# Patient Record
Sex: FEMALE | Race: WHITE | NOT HISPANIC OR LATINO | Employment: UNEMPLOYED | URBAN - METROPOLITAN AREA
[De-identification: names, ages, dates, MRNs, and addresses within clinical notes are randomized per-mention and may not be internally consistent; named-entity substitution may affect disease eponyms.]

---

## 2019-04-15 ENCOUNTER — HOSPITAL ENCOUNTER (OUTPATIENT)
Dept: GENERAL RADIOLOGY | Facility: HOSPITAL | Age: 49
Discharge: HOME | End: 2019-04-15

## 2020-01-30 ENCOUNTER — HOSPITAL ENCOUNTER (OUTPATIENT)
Dept: OTHER | Facility: HOSPITAL | Age: 50
Discharge: HOME | End: 2020-01-30
Attending: NURSE PRACTITIONER

## 2020-01-30 ENCOUNTER — OFFICE VISIT CONVERTED (OUTPATIENT)
Dept: INTERNAL MEDICINE | Facility: CLINIC | Age: 50
End: 2020-01-30
Attending: NURSE PRACTITIONER

## 2020-01-30 LAB
ALBUMIN SERPL-MCNC: 4.1 G/DL (ref 3.5–5)
ALBUMIN/GLOB SERPL: 1.3 {RATIO} (ref 1.4–2.6)
ALP SERPL-CCNC: 106 U/L (ref 42–98)
ALT SERPL-CCNC: 20 U/L (ref 10–40)
ANION GAP SERPL CALC-SCNC: 17 MMOL/L (ref 8–19)
AST SERPL-CCNC: 16 U/L (ref 15–50)
BASOPHILS # BLD AUTO: 0.05 10*3/UL (ref 0–0.2)
BASOPHILS NFR BLD AUTO: 0.5 % (ref 0–3)
BILIRUB SERPL-MCNC: 0.2 MG/DL (ref 0.2–1.3)
BUN SERPL-MCNC: 6 MG/DL (ref 5–25)
BUN/CREAT SERPL: 8 {RATIO} (ref 6–20)
CALCIUM SERPL-MCNC: 9.3 MG/DL (ref 8.7–10.4)
CHLORIDE SERPL-SCNC: 103 MMOL/L (ref 99–111)
CHOLEST SERPL-MCNC: 238 MG/DL (ref 107–200)
CHOLEST/HDLC SERPL: 5.5 {RATIO} (ref 3–6)
CONV ABS IMM GRAN: 0.07 10*3/UL (ref 0–0.2)
CONV CO2: 24 MMOL/L (ref 22–32)
CONV IMMATURE GRAN: 0.6 % (ref 0–1.8)
CONV TOTAL PROTEIN: 7.2 G/DL (ref 6.3–8.2)
CREAT UR-MCNC: 0.72 MG/DL (ref 0.5–0.9)
DEPRECATED RDW RBC AUTO: 49.8 FL (ref 36.4–46.3)
EOSINOPHIL # BLD AUTO: 0.09 10*3/UL (ref 0–0.7)
EOSINOPHIL # BLD AUTO: 0.8 % (ref 0–7)
ERYTHROCYTE [DISTWIDTH] IN BLOOD BY AUTOMATED COUNT: 12.8 % (ref 11.7–14.4)
GFR SERPLBLD BASED ON 1.73 SQ M-ARVRAT: >60 ML/MIN/{1.73_M2}
GLOBULIN UR ELPH-MCNC: 3.1 G/DL (ref 2–3.5)
GLUCOSE SERPL-MCNC: 102 MG/DL (ref 65–99)
HCT VFR BLD AUTO: 45.7 % (ref 37–47)
HDLC SERPL-MCNC: 43 MG/DL (ref 40–60)
HGB BLD-MCNC: 14.6 G/DL (ref 12–16)
LDLC SERPL CALC-MCNC: 158 MG/DL (ref 70–100)
LYMPHOCYTES # BLD AUTO: 3.91 10*3/UL (ref 1–5)
LYMPHOCYTES NFR BLD AUTO: 36.2 % (ref 20–45)
MCH RBC QN AUTO: 33.3 PG (ref 27–31)
MCHC RBC AUTO-ENTMCNC: 31.9 G/DL (ref 33–37)
MCV RBC AUTO: 104.3 FL (ref 81–99)
MONOCYTES # BLD AUTO: 0.51 10*3/UL (ref 0.2–1.2)
MONOCYTES NFR BLD AUTO: 4.7 % (ref 3–10)
NEUTROPHILS # BLD AUTO: 6.17 10*3/UL (ref 2–8)
NEUTROPHILS NFR BLD AUTO: 57.2 % (ref 30–85)
NRBC CBCN: 0 % (ref 0–0.7)
OSMOLALITY SERPL CALC.SUM OF ELEC: 288 MOSM/KG (ref 273–304)
PLATELET # BLD AUTO: 251 10*3/UL (ref 130–400)
PMV BLD AUTO: 11.8 FL (ref 9.4–12.3)
POTASSIUM SERPL-SCNC: 3.8 MMOL/L (ref 3.5–5.3)
RBC # BLD AUTO: 4.38 10*6/UL (ref 4.2–5.4)
SODIUM SERPL-SCNC: 140 MMOL/L (ref 135–147)
T4 FREE SERPL-MCNC: 1.3 NG/DL (ref 0.9–1.8)
TRIGL SERPL-MCNC: 184 MG/DL (ref 40–150)
TSH SERPL-ACNC: 1.01 M[IU]/L (ref 0.27–4.2)
VLDLC SERPL-MCNC: 37 MG/DL (ref 5–37)
WBC # BLD AUTO: 10.8 10*3/UL (ref 4.8–10.8)

## 2020-02-03 ENCOUNTER — HOSPITAL ENCOUNTER (OUTPATIENT)
Dept: CARDIOLOGY | Facility: HOSPITAL | Age: 50
Discharge: HOME | End: 2020-02-03
Attending: NURSE PRACTITIONER

## 2020-03-13 ENCOUNTER — OFFICE VISIT CONVERTED (OUTPATIENT)
Dept: INTERNAL MEDICINE | Facility: CLINIC | Age: 50
End: 2020-03-13
Attending: NURSE PRACTITIONER

## 2020-04-28 ENCOUNTER — TELEMEDICINE CONVERTED (OUTPATIENT)
Dept: INTERNAL MEDICINE | Facility: CLINIC | Age: 50
End: 2020-04-28
Attending: NURSE PRACTITIONER

## 2020-06-09 ENCOUNTER — TELEPHONE CONVERTED (OUTPATIENT)
Dept: INTERNAL MEDICINE | Facility: CLINIC | Age: 50
End: 2020-06-09
Attending: NURSE PRACTITIONER

## 2020-06-23 ENCOUNTER — HOSPITAL ENCOUNTER (OUTPATIENT)
Dept: OTHER | Facility: HOSPITAL | Age: 50
Discharge: HOME | End: 2020-06-23
Attending: NURSE PRACTITIONER

## 2020-06-23 ENCOUNTER — OFFICE VISIT CONVERTED (OUTPATIENT)
Dept: INTERNAL MEDICINE | Facility: CLINIC | Age: 50
End: 2020-06-23
Attending: NURSE PRACTITIONER

## 2020-06-23 LAB
APPEARANCE UR: CLEAR
BILIRUB UR QL: NEGATIVE
COLOR UR: YELLOW
CONV COLLECTION SOURCE (UA): NORMAL
CONV UROBILINOGEN IN URINE BY AUTOMATED TEST STRIP: 0.2 {EHRLICHU}/DL (ref 0.1–1)
GLUCOSE UR QL: NEGATIVE MG/DL
HGB UR QL STRIP: NEGATIVE
KETONES UR QL STRIP: NEGATIVE MG/DL
LEUKOCYTE ESTERASE UR QL STRIP: NEGATIVE
NITRITE UR QL STRIP: NEGATIVE
PH UR STRIP.AUTO: 6 [PH] (ref 5–8)
PROT UR QL: NEGATIVE MG/DL
SP GR UR: 1.01 (ref 1–1.03)

## 2020-06-25 LAB — BACTERIA UR CULT: NORMAL

## 2020-11-18 ENCOUNTER — HOSPITAL ENCOUNTER (OUTPATIENT)
Dept: OTHER | Facility: HOSPITAL | Age: 50
Discharge: HOME | End: 2020-11-18
Attending: PHYSICIAN ASSISTANT

## 2020-11-18 ENCOUNTER — OFFICE VISIT CONVERTED (OUTPATIENT)
Dept: INTERNAL MEDICINE | Facility: CLINIC | Age: 50
End: 2020-11-18
Attending: PHYSICIAN ASSISTANT

## 2020-11-18 ENCOUNTER — HOSPITAL ENCOUNTER (OUTPATIENT)
Dept: CT IMAGING | Facility: HOSPITAL | Age: 50
Discharge: HOME | End: 2020-11-18
Attending: PHYSICIAN ASSISTANT

## 2020-11-18 ENCOUNTER — CONVERSION ENCOUNTER (OUTPATIENT)
Dept: INTERNAL MEDICINE | Facility: CLINIC | Age: 50
End: 2020-11-18

## 2020-11-18 LAB
ALBUMIN SERPL-MCNC: 4.3 G/DL (ref 3.5–5)
ALBUMIN/GLOB SERPL: 1.4 {RATIO} (ref 1.4–2.6)
ALP SERPL-CCNC: 117 U/L (ref 53–141)
ALT SERPL-CCNC: 20 U/L (ref 10–40)
ANION GAP SERPL CALC-SCNC: 15 MMOL/L (ref 8–19)
AST SERPL-CCNC: 21 U/L (ref 15–50)
BASOPHILS # BLD AUTO: 0.06 10*3/UL (ref 0–0.2)
BASOPHILS # BLD: 0 % (ref 0–3)
BASOPHILS NFR BLD AUTO: 0.6 % (ref 0–3)
BILIRUB SERPL-MCNC: 0.24 MG/DL (ref 0.2–1.3)
BUN SERPL-MCNC: 6 MG/DL (ref 5–25)
BUN/CREAT SERPL: 8 {RATIO} (ref 6–20)
CALCIUM SERPL-MCNC: 10 MG/DL (ref 8.7–10.4)
CHLORIDE SERPL-SCNC: 102 MMOL/L (ref 99–111)
CONV ABS BANDS: 0 % (ref 1–5)
CONV ABS IMM GRAN: 0.06 10*3/UL (ref 0–0.2)
CONV ANISOCYTES: SLIGHT
CONV ATYPICAL LYMPHOCYTES: 12 % (ref 0–5)
CONV CO2: 25 MMOL/L (ref 22–32)
CONV HYPOCHROMIA IN BLOOD BY LIGHT MICROSCOPY: SLIGHT
CONV IMMATURE GRAN: 0.6 % (ref 0–1.8)
CONV SEGMENTED NEUTROPHILS: 47 % (ref 45–70)
CONV TOTAL PROTEIN: 7.4 G/DL (ref 6.3–8.2)
CREAT UR-MCNC: 0.72 MG/DL (ref 0.5–0.9)
DACRYOCYTES BLD QL SMEAR: ABNORMAL
DEPRECATED RDW RBC AUTO: 47.9 FL (ref 36.4–46.3)
EOSINOPHIL # BLD AUTO: 0.09 10*3/UL (ref 0–0.7)
EOSINOPHIL # BLD AUTO: 0.8 % (ref 0–7)
EOSINOPHIL NFR BLD AUTO: 2 % (ref 0–7)
ERYTHROCYTE [DISTWIDTH] IN BLOOD BY AUTOMATED COUNT: 12.6 % (ref 11.7–14.4)
GFR SERPLBLD BASED ON 1.73 SQ M-ARVRAT: >60 ML/MIN/{1.73_M2}
GLOBULIN UR ELPH-MCNC: 3.1 G/DL (ref 2–3.5)
GLUCOSE SERPL-MCNC: 89 MG/DL (ref 65–99)
HCT VFR BLD AUTO: 47.7 % (ref 37–47)
HGB BLD-MCNC: 15.7 G/DL (ref 12–16)
LARGE PLATELETS: SLIGHT
LYMPHOCYTES # BLD AUTO: 4.26 10*3/UL (ref 1–5)
LYMPHOCYTES NFR BLD AUTO: 39.7 % (ref 20–45)
MCH RBC QN AUTO: 33.8 PG (ref 27–31)
MCHC RBC AUTO-ENTMCNC: 32.9 G/DL (ref 33–37)
MCV RBC AUTO: 102.8 FL (ref 81–99)
MONOCYTES # BLD AUTO: 0.62 10*3/UL (ref 0.2–1.2)
MONOCYTES NFR BLD AUTO: 5.8 % (ref 3–10)
MONOCYTES NFR BLD MANUAL: 10 % (ref 3–10)
NEUTROPHILS # BLD AUTO: 5.63 10*3/UL (ref 2–8)
NEUTROPHILS NFR BLD AUTO: 52.5 % (ref 30–85)
NRBC CBCN: 0 % (ref 0–0.7)
NUC CELL # PRT MANUAL: 0 /100{WBCS}
OSMOLALITY SERPL CALC.SUM OF ELEC: 283 MOSM/KG (ref 273–304)
PLAT MORPH BLD: NORMAL
PLATELET # BLD AUTO: 232 10*3/UL (ref 130–400)
PMV BLD AUTO: 12.4 FL (ref 9.4–12.3)
POLYCHROMASIA BLD QL SMEAR: ABNORMAL
POTASSIUM SERPL-SCNC: 4.4 MMOL/L (ref 3.5–5.3)
RBC # BLD AUTO: 4.64 10*6/UL (ref 4.2–5.4)
SMALL PLATELETS BLD QL SMEAR: ADEQUATE
SODIUM SERPL-SCNC: 138 MMOL/L (ref 135–147)
VARIANT LYMPHS NFR BLD MANUAL: 29 % (ref 20–45)
WBC # BLD AUTO: 10.72 10*3/UL (ref 4.8–10.8)

## 2020-11-19 ENCOUNTER — HOSPITAL ENCOUNTER (OUTPATIENT)
Dept: URGENT CARE | Facility: CLINIC | Age: 50
Discharge: HOME | End: 2020-11-19
Attending: PHYSICIAN ASSISTANT

## 2021-05-12 NOTE — PROGRESS NOTES
Progress Note      Patient Name: Isabel Corral   Patient ID: [id number]   Sex: Female   YOB: 1970    Primary Care Provider: Karina DONOHUE   Referring Provider: Karina DONOHUE    Visit Date: April 28, 2020    Provider: CHARANJIT Beckman   Location: [facility]   Location Address: [address] [place] [address]  [id number]   Location Phone: [phone]          Chief Complaint  · 3mo f/u   · anxiety and chest pain  · HTN      History Of Present Illness  Video Conferencing Visit  Isabel Corral is a 49 year old /White female who is presenting for evaluation via video conferencing. Verbal consent obtained before beginning visit.   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 last refill 9/2019    /76, HR 73    Back hurting on right side-she has had this before- flexeril helps but she recently ran out of this  previously thought this was a pulled muscle    Hot flashes- started taking estroven otc    via zoom       Past Medical History  Disease Name Date Onset Notes   Anxiety --  --    Arthritis --  --    Breast prosthesis and implant 12/20/2011 --    Depression (emotion) --  --    GERD (gastroesophageal reflux disease) --  --    Hyperlipidemia --  --    Hypertension --  --    Migraine Headaches Since her teen yrs. gets headaches 2-3 times a week now.   NIght Sweats --  --    Ringing in ears 4-5 yrs ago 2-3 times monthly accompanied by dizziness. More in  the Summer time.         Past Surgical History  Procedure Name Date Notes   Breast augmentation surgery, bilateral 2004 Enhancement   Hysterectomy 2007 Partial         Medication List  Name Date Started Instructions   buspirone 10 mg oral tablet 01/30/2020 take 1 tablet (10 mg) by oral route 3 times per day for 30 days   butalbital-acetaminophen-caff -40 mg oral capsule 04/28/2020 take 1 capsule by oral route every 4 hours as needed not to exceed 6 capsules per 24hrs   clonazepam 0.5 mg oral tablet  take 1 tablet (0.5 mg) by oral route 2 times per day   cyclobenzaprine 10 mg oral tablet  take 1 tablet (10 mg) by oral route 3 times per day   Excedrin Migraine 250-250-65 mg Oral Tablet  take 1-2 tablets by oral route As needed   Inderal LA 60 mg oral capsule,extended release 24 hr 04/28/2020 take 1 capsule (60 mg) by oral route once daily for 90 days   Zyrtec 10 mg oral tablet  take 1 tablet (10 mg) by oral route once daily         Allergy List  Allergen Name Date Reaction Notes   NO KNOWN DRUG ALLERGIES --  --  --        Allergies Reconciled  Family Medical History  Disease Name Relative/Age Notes   Skin Carcinoma In Situ  grandfather nos   Stroke Brother/   grandparent nos   Heart Disease Brother/  Uncle/   grandparent nos. Uncle had heart attack at age 45.   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   Tobacco Current every day --/-- 1/2 pk daily --          Review of Systems  · Constitutional  o Denies  o : fever, fatigue, weight loss, weight gain  · Cardiovascular  o Denies  o : lower extremity edema, claudication, chest pressure, palpitations  · Respiratory  o Denies  o : shortness of breath, wheezing, frequent cough, hemoptysis, dyspnea on exertion  · Gastrointestinal  o Denies  o : nausea, vomiting, diarrhea, constipation, abdominal pain      Physical Examination     Gen:  well-nourished, no acute distress  HENT: atraumatic, normocephalic  Eyes: extraocular movements intact, no scleral icterus  Lung: breathing comfortably, no cough  Skin: no visible rash, no lesions  Neuro: grossly oriented to person, place, and time. no facial droop   Psych: normal mood and affect             Assessment  · Anxiety     300.02/F41.1  well controlled since quitting her job  · Hypertension     401.9/I10  good readings since start inderal for migraines  · Migraine headache     346.90/G43.909  will try amitriptyline qhs since she is also having a harder time sleeping due to hot flashes. discussed stopping inderal since she does not report improvement of headaches, but she prefers to continue that at this time. Refilling fioricet at this time. diana reviewed. will need UDS and consent when she returns for next office visit. discussed risk for abuse/addiction.  · Lumbago     724.2/M54.5  will try muscle relaxer and mail her conditioning exercises for her back.      Plan  · Orders  o ACO-39: Current medications updated and reviewed () - - 04/28/2020  · Medications  o cyclobenzaprine 5 mg oral tablet   SIG: take 1 tablet (5 mg) by oral route 3 times per day prn   DISP: (90) tablets with 0 refills  Prescribed on 04/28/2020     o butalbital-acetaminophen-caff -40 mg oral capsule   SIG: take 1 capsule by oral route every 4 hours as needed not to exceed 6 capsules per 24hrs   DISP: (30) capsules with 0 refills  Prescribed on 04/28/2020     o amitriptyline 25 mg oral tablet   SIG: take 1 tablet (25 mg) by oral route once daily at bedtime for 30 days   DISP: (30) tablets with 0 refills  Prescribed on 04/28/2020     o Inderal LA 60 mg oral capsule,extended release 24 hr   SIG: take 1 capsule (60 mg) by oral route once daily for 90 days   DISP: (90) capsules with 1 refills  Adjusted on 04/28/2020     o Fioricet -40 mg oral capsule   SIG: take 2 capsules by oral route every 4 hours as needed not to  exceed 6 capsules per 24hrs   DISP: (0) capsule with 0 refills  Discontinued on 04/28/2020     o Medications have been Reconciled  o Transition of Care or Provider Policy  · Instructions  o Patient was educated/instructed on their diagnosis, treatment and medications prior to discharge from the clinic today.  o Call the office with any concerns or questions.  · Disposition  o Call or Return if symptoms worsen or persist.  o Follow up in 6 weeks  o Prescriptions sent electronically            Electronically Signed by: CHARANJIT Beckman -Author on April 28, 2020 12:12:27 PM

## 2021-05-13 NOTE — PROGRESS NOTES
Progress Note      Patient Name: Isabel Corral   Patient ID: [id number]   Sex: Female   YOB: 1970    Primary Care Provider: Karina DONOHUE   Referring Provider: Karina DONOHUE    Visit Date: June 9, 2020    Provider: CHARANJIT Beckman   Location: [facility]   Location Address: [address] [place] [address]  [id number]   Location Phone: [phone]          Chief Complaint  · follow up  · back pain  · migraines  · anxiety  · HTN      History Of Present Illness  TELEHEALTH TELEPHONE VISIT  Chief Complaint: f/u   Isabel Corral is a 50 year old /White female who is presenting for evaluation via telehealth telephone visit. Verbal consent obtained before beginning visit.   Provider spent 14 minutes with the patient during the telehealth visit.   The following staff were present during this visit: Karina Tee NP   Past Medical History/ Overview of Patient Symptoms  Isabel Corral is a 50 year old /White female who presents for evaluation and treatment of:      Back pain- flexeril 10mg previously worked better than 5mg. has been doing exercises at home without improvement. she would like to get imaging. she is not willing to do outpatient PT at this time    anxiety-tolerable at present    migraines-amitriptyline caused her to have palpitations/panic attack x3 nights so she has not taken this since. migraines not worsening but having these fairly frequently. HR 70-80s most of the time when she checks her BP       Past Medical History  Disease Name Date Onset Notes   Anxiety --  --    Arthritis --  --    Breast prosthesis and implant 12/20/2011 --    Depression (emotion) --  --    GERD (gastroesophageal reflux disease) --  --    Hyperlipidemia --  --    Hypertension --  --    Migraine Headaches Since her teen yrs. gets headaches 2-3 times a week now.   NIght Sweats --  --    Ringing in ears 4-5 yrs ago 2-3 times monthly accompanied by  dizziness. More in the Summer time.         Past Surgical History  Procedure Name Date Notes   Breast augmentation surgery, bilateral 2004 Enhancement   Hysterectomy 2007 Partial         Medication List  Name Date Started Instructions   buspirone 10 mg oral tablet 01/30/2020 take 1 tablet (10 mg) by oral route 3 times per day for 30 days   butalbital-acetaminophen-caff -40 mg oral capsule 04/28/2020 take 1 capsule by oral route every 4 hours as needed not to exceed 6 capsules per 24hrs   clonazepam 0.5 mg oral tablet  take 1 tablet (0.5 mg) by oral route 2 times per day   cyclobenzaprine 10 mg oral tablet 06/09/2020 take 1 tablet (10 mg) by oral route 3 times per day   Excedrin Migraine 250-250-65 mg Oral Tablet  take 1-2 tablets by oral route As needed   Inderal LA 80 mg oral capsule,extended release 24 hr 06/09/2020 take 1 capsule (80 mg) by oral route once daily for 90 days   Zyrtec 10 mg oral tablet  take 1 tablet (10 mg) by oral route once daily         Allergy List  Allergen Name Date Reaction Notes   NO KNOWN DRUG ALLERGIES --  --  --          Family Medical History  Disease Name Relative/Age Notes   Skin Carcinoma In Situ  grandfather nos   Stroke Brother/   grandparent nos   Heart Disease Brother/  Uncle/   grandparent nos. Uncle had heart attack at age 45.   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   Tobacco Current every day --/-- 1/2 pk daily --          Review of Systems  · Constitutional  o Denies  o : fever, fatigue, weight loss, weight gain  · Cardiovascular  o Denies  o : lower extremity edema, claudication, chest pressure, palpitations  · Respiratory  o Denies  o : shortness of breath, wheezing, frequent cough, hemoptysis, dyspnea on exertion  · Gastrointestinal  o Denies  o : nausea, vomiting, diarrhea, constipation, abdominal pain      Physical  Examination                Assessment  · Hypertension     401.9/I10  well controlled  · Anxiety disorder     300.00/F41.9  well controlled  · Lumbago     724.2/M54.5  she will go to [facility] today to xray of lspine. encouraged her to try outpatient PT but she declines at this time. she would like to continue exercises at home and flexeril prn  · Migraines     346.90/G43.909  will increase propranolol to 80mg daily. She will cont to monitor HR/BP.     Problems Reconciled  Plan  · Orders  o Physician Telephone Evaluation, 11-20 minutes (69236) - - 06/09/2020  o ACO-39: Current medications updated and reviewed () - - 06/09/2020  o Lumbar Spine Complete [facility] (19730) - 724.2/M54.5 - 06/09/2020  · Medications  o cyclobenzaprine 10 mg oral tablet   SIG: take 1 tablet (10 mg) by oral route 3 times per day   DISP: (90) tablet with 1 refills  Adjusted on 06/09/2020     o Inderal LA 80 mg oral capsule,extended release 24 hr   SIG: take 1 capsule (80 mg) by oral route once daily for 90 days   DISP: (90) capsules with 1 refills  Adjusted on 06/09/2020     o amitriptyline 25 mg oral tablet   SIG: take 1 tablet (25 mg) by oral route once daily at bedtime for 30 days   DISP: (30) tablets with 0 refills  Discontinued on 06/09/2020     o cyclobenzaprine 5 mg oral tablet   SIG: take 1 tablet (5 mg) by oral route 3 times per day prn   DISP: (90) tablets with 0 refills  Discontinued on 06/09/2020     o Medications have been Reconciled  o Transition of Care or Provider Policy  · Instructions  o Plan Of Care:   o Call the office with any concerns or questions.  o Patient was educated/instructed on their diagnosis, treatment and medications prior to discharge from the clinic today.  · Disposition  o Call or Return if symptoms worsen or persist.  o Follow up in 2 months  o Prescriptions sent electronically            Electronically Signed by: Karina Tee APRN -Author on June 9, 2020 09:51:59 AM

## 2021-05-13 NOTE — PROGRESS NOTES
Progress Note      Patient Name: Isabel Corral   Patient ID: [id number]   Sex: Female   YOB: 1970    Primary Care Provider: Karina DONOHUE   Referring Provider: Karina DONOHUE    Visit Date: November 18, 2020    Provider: Enedelia Awan PA-C   [facility]   Location Address: [address] [place] [address], [address]  [place], [place]  [id number]   Location Phone: [phone]          Chief Complaint  · Acute visit      History Of Present Illness  Isabel Corral is a 50 year old /White female who presents for evaluation and treatment of:      stomach pain.  Has loose stools right after she eats.  This has happened 4-5 times a day.  Pain is constant but worse after she eats and has a bowel movement.  Pain is in her abdomen.  She admits that the pain has been so bad it makes her feel like she might pass out.  No hematochezia or melena.  She has had nausea and one episode of vomiting.  Patient was around sister 2.5 weeks ago who tested positive for covid. Symptoms started 1-1.5 weeks ago.  No fever, cough or congestion.    Patient denies ever having a colonoscopy but would like to have one scheduled.       Past Medical History  Disease Name Date Onset Notes   Anxiety --  --    Arthritis --  --    Breast prosthesis and implant 12/20/2011 --    Depression (emotion) --  --    GERD (gastroesophageal reflux disease) --  --    Hyperlipidemia --  --    Hypertension --  --    Migraine Headaches Since her teen yrs. gets headaches 2-3 times a week now.   NIght Sweats --  --    Ringing in ears 4-5 yrs ago 2-3 times monthly accompanied by dizziness. More in the Summer time.         Past Surgical History  Procedure Name Date Notes   Breast augmentation surgery, bilateral 2004 Enhancement   Hysterectomy 2007 Partial         Medication List  Name Date Started Instructions   butalbital-acetaminophen-caff -40 mg oral capsule 04/28/2020 take 1 capsule by oral route every 4 hours as  "needed not to exceed 6 capsules per 24hrs   clonazepam 0.5 mg oral tablet  take 1 tablet (0.5 mg) by oral route 2 times per day   cyclobenzaprine 10 mg oral tablet 06/09/2020 take 1 tablet (10 mg) by oral route 3 times per day   Excedrin Migraine 250-250-65 mg Oral Tablet  take 1-2 tablets by oral route As needed   Inderal LA 80 mg oral capsule,extended release 24 hr 06/09/2020 take 1 capsule (80 mg) by oral route once daily for 90 days   Zyrtec 10 mg oral tablet  take 1 tablet (10 mg) by oral route once daily         Allergy List  Allergen Name Date Reaction Notes   NO KNOWN DRUG ALLERGIES --  --  --          Family Medical History  Disease Name Relative/Age Notes   Skin Carcinoma In Situ  grandfather nos   Stroke Brother/   grandparent nos   Heart Disease Brother/  Uncle/   grandparent nos. Uncle had heart attack at age 45.   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   Tobacco Current every day --/-- 1/2 pk daily --          Review of Systems  · Constitutional  o Denies  o : fever, fatigue, weight loss, weight gain  · Cardiovascular  o Denies  o : lower extremity edema, claudication, chest pressure, palpitations  · Respiratory  o Denies  o : shortness of breath, wheezing, cough, hemoptysis, dyspnea on exertion  · Gastrointestinal  o Admits  o : nausea, vomiting, diarrhea, abdominal pain  o Denies  o : constipation      Vitals  Date Time BP Position Site L\R Cuff Size HR RR TEMP (F) WT  HT  BMI kg/m2 BSA m2 O2 Sat FR L/min FiO2 HC       03/13/2020 11:40 /78 Sitting    72 - R 16 98.5 159lbs 8oz 5'  4\" 27.38 1.81 100 %  21%    06/23/2020 02:46 /70 Sitting    80 - R 18 97.9 163lbs 4oz 5'  4\" 28.02 1.83 99 %  21%    11/18/2020 02:51 /82 Sitting    78 - R  97 153lbs 6oz 5'  4\" 26.33 1.77 98 %            Physical Examination  · Constitutional  o Appearance  o : no acute " distress, well-nourished  · Head and Face  o Head  o :   § Inspection  § : atraumatic, normocephalic  · Eyes  o Eyes  o : extraocular movements intact, no scleral icterus, no conjunctival injection  · Ears, Nose, Mouth and Throat  o Ears  o :   § External Ears  § : normal  o Nose  o :   § Intranasal Exam  § : nares patent  o Oral Cavity  o :   § Oral Mucosa  § : moist mucous membranes  · Respiratory  o Respiratory Effort  o : breathing comfortably, symmetric chest rise  o Auscultation of Lungs  o : clear to asculatation bilaterally, no wheezes, rales, or rhonchii  · Cardiovascular  o Heart  o :   § Auscultation of Heart  § : regular rate and rhythm, no murmurs, rubs, or gallops  o Peripheral Vascular System  o :   § Extremities  § : no edema  · Gastrointestinal  o Abdominal Examination  o :   § Abdomen  § : generalized abdominal tenderness, worse in RLQ, + rebound tenderness  · Skin and Subcutaneous Tissue  o General Inspection  o : no lesions present, no areas of discoloration, skin turgor normal  · Neurologic  o Mental Status Examination  o :   § Orientation  § : grossly oriented to person, place and time  o Gait and Station  o :   § Gait Screening  § : normal gait  · Psychiatric  o General  o : normal mood and affect              Assessment  · Abdominal pain     789.00/R10.9  Concern for appendicitis vs. diverticulitis vs. infectious colitis. Will get CT of abdomen and pelvis today and treat as indicated dependent on results. We will also check basic labs.  · Exposure to COVID-19 virus     V01.79/Z20.828  Will get tested for Covid due to exposure.  · Screening for colon cancer     V76.51/Z12.11  Will schedule screening colonoscopy  · Nausea     787.02/R11.0    Problems Reconciled  Plan  · Orders  o CBC with Auto Diff [facility] (22074) - 789.00/R10.9 - 11/18/2020  o CMP [facility] (69682) - 789.00/R10.9 - 11/18/2020  o [facility] NCOV2 (send-out) (21007) - V01.79/Z20.828 - 11/18/2020  o COLONOSCOPY REFERRAL (COLON) -  V76.51/Z12.11 - 11/18/2020  o ACO-39: Current medications updated and reviewed (, 1159F) - - 11/18/2020  o [facility] NCOV2 (send-out) (80430) - 789.00/R10.9 - 11/18/2020  o CT ABD&PELV with and without contrast (35239) - 787.02/R11.0, 789.00/R10.9 - 11/18/2020   With oral prep if necessary  · Medications  o Medications have been Reconciled  o Transition of Care or Provider Policy  · Instructions  o Instructed to seek medical attention urgently for new or worsening symptoms.  o Patient was educated/instructed on their diagnosis, treatment and medications prior to discharge from the clinic today.  o Patient instructed to seek medical attention urgently for new or worsening symptoms.  o Call the office with any concerns or questions.  · Disposition  o Call or Return if symptoms worsen or persist.  o follow up as needed            Electronically Signed by: Enedelia Awan PA-C -Author on November 18, 2020 05:13:35 PM

## 2021-05-13 NOTE — PROGRESS NOTES
"   Progress Note      Patient Name: Isabel Corral   Patient ID: [id number]   Sex: Female   YOB: 1970    Primary Care Provider: Karina DONOHUE   Referring Provider: Karina DONOHUE    Visit Date: June 23, 2020    Provider: CHARANJIT Beckman   Location: [facility]   Location Address: [address] [place] [address]  [id number]   Location Phone: [phone]          Chief Complaint  · \"I would like to talk about neck and back pain\"      History Of Present Illness  Isabel Corral is a 50 year old /White female who presents for evaluation and treatment of:      Neck pain has resolved, she reports lump has resolved    Back pain is unchanged. any movement causes worsening pain. pain is right lower back. she denies pain centrally. Pain has not worsened since last visit. She did not go get lumbar xray following last visit.  has been taking flexeril  she states that she is not able to afford outpatient PT at this time  denies hx of kidney stone, dysuria, hematuria    she has started doing exercises at home         Past Medical History  Disease Name Date Onset Notes   Anxiety --  --    Arthritis --  --    Breast prosthesis and implant 12/20/2011 --    Depression (emotion) --  --    GERD (gastroesophageal reflux disease) --  --    Hyperlipidemia --  --    Hypertension --  --    Migraine Headaches Since her teen yrs. gets headaches 2-3 times a week now.   NIght Sweats --  --    Ringing in ears 4-5 yrs ago 2-3 times monthly accompanied by dizziness. More in the Summer time.         Past Surgical History  Procedure Name Date Notes   Breast augmentation surgery, bilateral 2004 Enhancement   Hysterectomy 2007 Partial         Medication List  Name Date Started Instructions   butalbital-acetaminophen-caff -40 mg oral capsule 04/28/2020 take 1 capsule by oral route every 4 hours as needed not to exceed 6 capsules per 24hrs   clonazepam 0.5 mg oral tablet  take 1 " "tablet (0.5 mg) by oral route 2 times per day   cyclobenzaprine 10 mg oral tablet 06/09/2020 take 1 tablet (10 mg) by oral route 3 times per day   Excedrin Migraine 250-250-65 mg Oral Tablet  take 1-2 tablets by oral route As needed   Inderal LA 80 mg oral capsule,extended release 24 hr 06/09/2020 take 1 capsule (80 mg) by oral route once daily for 90 days   Zyrtec 10 mg oral tablet  take 1 tablet (10 mg) by oral route once daily         Allergy List  Allergen Name Date Reaction Notes   NO KNOWN DRUG ALLERGIES --  --  --        Allergies Reconciled  Family Medical History  Disease Name Relative/Age Notes   Skin Carcinoma In Situ  grandfather nos   Stroke Brother/   grandparent nos   Heart Disease Brother/  Uncle/   grandparent nos. Uncle had heart attack at age 45.   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   Tobacco Current every day --/-- 1/2 pk daily --          Review of Systems  · Constitutional  o Denies  o : fever, fatigue, weight loss, weight gain  · Cardiovascular  o Denies  o : lower extremity edema, claudication, chest pressure, palpitations  · Respiratory  o Denies  o : shortness of breath, wheezing, cough, hemoptysis, dyspnea on exertion  · Gastrointestinal  o Denies  o : nausea, vomiting, diarrhea, constipation, abdominal pain      Vitals  Date Time BP Position Site L\R Cuff Size HR RR TEMP (F) WT  HT  BMI kg/m2 BSA m2 O2 Sat HC       01/30/2020 01:51 /80 Sitting    93 - R 14 97.7 161lbs 0oz 5'  4\" 27.64 1.82 99 %    03/13/2020 11:40 /78 Sitting    72 - R 16 98.5 159lbs 8oz 5'  4\" 27.38 1.81 100 %    06/23/2020 02:46 /70 Sitting    80 - R 18 97.9 163lbs 4oz 5'  4\" 28.02 1.83 99 %          Physical Examination  · Constitutional  o Appearance  o : no acute distress, well-nourished  · Head and Face  o Head  o :   § Inspection  § : atraumatic, " normocephalic  · Eyes  o Eyes  o : extraocular movements intact, no scleral icterus, no conjunctival injection  · Ears, Nose, Mouth and Throat  o Ears  o :   § External Ears  § : normal  o Nose  o :   § Intranasal Exam  § : nares patent  o Oral Cavity  o :   § Oral Mucosa  § : moist mucous membranes  · Respiratory  o Respiratory Effort  o : breathing comfortably, symmetric chest rise  o Auscultation of Lungs  o : clear to asculatation bilaterally, no wheezes, rales, or rhonchii  · Cardiovascular  o Heart  o :   § Auscultation of Heart  § : regular rate and rhythm, no murmurs, rubs, or gallops  o Peripheral Vascular System  o :   § Extremities  § : no edema  · Neurologic  o Mental Status Examination  o :   § Orientation  § : grossly oriented to person, place and time  o Gait and Station  o :   § Gait Screening  § : normal gait  · Psychiatric  o General  o : normal mood and affect     MSK-low back-tenderness to palpation of right side of back. pain reproducible with ROM, spasm present on right lower back, NV distally intact           Results  · In-Office Procedures  o Lab procedure  § IOP - Urinalysis without Microscopy (Clinitek) [facility] ([id number])   § Color Ur: Yellow   § Clarity Ur: Clear   § Glucose Ur Ql Strip: Negative   § Bilirub Ur Ql Strip: Negative   § Ketones Ur Ql Strip: Negative   § Sp Gr Ur Qn: less than 1.000   § Hgb Ur Ql Strip: Negative   § pH Ur-LsCnc: 5.5   § Prot Ur Ql Strip: Negative   § Urobilinogen Ur Strip-mCnc: 0.2 E.U./dL   § Nitrite Ur Ql Strip: Negative   § WBC Est Ur Ql Strip: Negative       Assessment  · Essential hypertension     401.9/I10  · Lumbago     724.2/M54.5  right sided, likely musculoskeletal, UA and xray  plan to continue stretches/exercises, nsaids, muscle relaxer, ice/heat. Toradol IM in the office today  · Right flank pain     789.09/R10.9    Problems Reconciled  Plan  · Orders  o Urinalysis with Reflex Microscopy if abnormal ([facility]) ([id number]) - 789.09/R10.9 - 06/23/2020  o Urine  culture ([id number], [id number]) - 789.09/R10.9 - 06/23/2020  o IM/SQ - Injection Fee [facility] ([id number]) - 789.09/R10.9 - 06/23/2020  o ACO-39: Current medications updated and reviewed () - - 06/23/2020  o 4.00 - Toradol Injection 60mg (-5) - 789.09/R10.9 - 06/23/2020   Injection - Toradol 60 mg; Dose: 60mg(2mL); Site: Right Gluteus; Route: intramuscular; Date: 06/23/2020 16:53:46; Exp: 09/01/2021; Lot: [id number]; Mfg: [employer]; TradeName: Toradol; Location: [facility]; Administered By: Shelley Strauss MA; Comment: Pt tolerated well. Left the office in stable condition. ASHLEY ABBOTT.  · Medications  o Medications have been Reconciled  o Transition of Care or Provider Policy  · Instructions  o Patient was educated/instructed on their diagnosis, treatment and medications prior to discharge from the clinic today.  o Call the office with any concerns or questions.  · Disposition  o Call or Return if symptoms worsen or persist.  o Follow up in 1 month  o Xray performed in clinic            Electronically Signed by: CHARANJIT Beckman -Author on June 25, 2020 12:12:24 PM

## 2021-05-14 VITALS
WEIGHT: 153.37 LBS | HEIGHT: 64 IN | HEART RATE: 78 BPM | DIASTOLIC BLOOD PRESSURE: 82 MMHG | SYSTOLIC BLOOD PRESSURE: 128 MMHG | TEMPERATURE: 97 F | OXYGEN SATURATION: 98 % | BODY MASS INDEX: 26.18 KG/M2

## 2021-05-15 VITALS
HEART RATE: 80 BPM | SYSTOLIC BLOOD PRESSURE: 128 MMHG | OXYGEN SATURATION: 99 % | RESPIRATION RATE: 18 BRPM | BODY MASS INDEX: 27.87 KG/M2 | HEIGHT: 64 IN | WEIGHT: 163.25 LBS | TEMPERATURE: 97.9 F | DIASTOLIC BLOOD PRESSURE: 70 MMHG

## 2021-05-15 VITALS
TEMPERATURE: 98.5 F | SYSTOLIC BLOOD PRESSURE: 118 MMHG | DIASTOLIC BLOOD PRESSURE: 78 MMHG | HEART RATE: 72 BPM | WEIGHT: 159.5 LBS | RESPIRATION RATE: 16 BRPM | BODY MASS INDEX: 27.23 KG/M2 | OXYGEN SATURATION: 100 % | HEIGHT: 64 IN

## 2021-05-15 VITALS
WEIGHT: 161 LBS | TEMPERATURE: 97.7 F | OXYGEN SATURATION: 99 % | RESPIRATION RATE: 14 BRPM | HEIGHT: 64 IN | BODY MASS INDEX: 27.49 KG/M2 | HEART RATE: 93 BPM | SYSTOLIC BLOOD PRESSURE: 148 MMHG | DIASTOLIC BLOOD PRESSURE: 80 MMHG

## 2021-06-07 ENCOUNTER — TELEPHONE (OUTPATIENT)
Dept: INTERNAL MEDICINE | Facility: CLINIC | Age: 51
End: 2021-06-07

## 2021-06-07 NOTE — TELEPHONE ENCOUNTER
Caller: Isabel Corral    Relationship: Self    Best call back number: [phone]    What is the best time to reach you: ANY   Who are you requesting to speak with (clinical staff, provider,  specific staff member): PCP     Do you know the name of the person who called:    What was the call regarding: PATIENT STATES THAT THE MEDICATION SHE WAS PRESCRIBED BY HER PCP DOES NOT HELP.  PATIENT STATES THAT HER MIGRAINES HAVE LASTED FOR 7 DAYS THIS TIME.     Do you require a callback: YES    PLEASE ADVISE [phone]

## 2021-06-08 NOTE — TELEPHONE ENCOUNTER
Discussed with patient, has not been seen in clinic for over 6 months. Patient not in distress at this time. Recommended seeking care and urgent care and making f/u appointment to discuss future management. Voiced understanding. CLAIRE RN

## 2021-10-14 ENCOUNTER — TELEPHONE (OUTPATIENT)
Dept: INTERNAL MEDICINE | Facility: CLINIC | Age: 51
End: 2021-10-14

## 2021-10-14 RX ORDER — PROPRANOLOL HYDROCHLORIDE 40 MG/1
40 TABLET ORAL DAILY
Qty: 90 TABLET | Refills: 1 | Status: SHIPPED | OUTPATIENT
Start: 2021-10-14 | End: 2021-11-02 | Stop reason: SDUPTHER

## 2021-10-14 RX ORDER — PROPRANOLOL HYDROCHLORIDE 40 MG/1
40 TABLET ORAL DAILY
COMMUNITY
End: 2021-10-14 | Stop reason: SDUPTHER

## 2021-10-14 NOTE — TELEPHONE ENCOUNTER
Caller: Isabel Corral    Relationship: Self      Medication requested (name and dosage): [facility]    Pharmacy where request should be sent:[facility], [place] - [address] [place] [address] AT [facility] ( [address]) & [address] - [phone] [facility] [phone]   [phone]    Best call back number: [phone]     Does the patient have less than a 3 day supply:  [x] Yes  [] No    Jase Joshi Rep   10/14/21 14:12 EDT

## 2021-11-02 ENCOUNTER — OFFICE VISIT (OUTPATIENT)
Dept: INTERNAL MEDICINE | Facility: CLINIC | Age: 51
End: 2021-11-02

## 2021-11-02 VITALS
TEMPERATURE: 97.7 F | WEIGHT: 160.5 LBS | DIASTOLIC BLOOD PRESSURE: 74 MMHG | HEART RATE: 61 BPM | HEIGHT: 64 IN | BODY MASS INDEX: 27.4 KG/M2 | OXYGEN SATURATION: 98 % | SYSTOLIC BLOOD PRESSURE: 122 MMHG

## 2021-11-02 DIAGNOSIS — K21.9 GASTROESOPHAGEAL REFLUX DISEASE, UNSPECIFIED WHETHER ESOPHAGITIS PRESENT: ICD-10-CM

## 2021-11-02 DIAGNOSIS — Z00.00 ANNUAL PHYSICAL EXAM: Primary | ICD-10-CM

## 2021-11-02 DIAGNOSIS — D72.829 LEUKOCYTOSIS, UNSPECIFIED TYPE: ICD-10-CM

## 2021-11-02 DIAGNOSIS — Z12.11 ENCOUNTER FOR SCREENING COLONOSCOPY: ICD-10-CM

## 2021-11-02 DIAGNOSIS — G43.709 CHRONIC MIGRAINE WITHOUT AURA WITHOUT STATUS MIGRAINOSUS, NOT INTRACTABLE: ICD-10-CM

## 2021-11-02 DIAGNOSIS — E78.2 MIXED HYPERLIPIDEMIA: ICD-10-CM

## 2021-11-02 PROBLEM — F41.9 ANXIETY: Status: ACTIVE | Noted: 2021-11-02

## 2021-11-02 PROBLEM — F32.A DEPRESSION: Status: ACTIVE | Noted: 2021-11-02

## 2021-11-02 LAB
ALBUMIN SERPL-MCNC: 4.6 G/DL (ref 3.5–5.2)
ALBUMIN/GLOB SERPL: 1.5 G/DL
ALP SERPL-CCNC: 128 U/L (ref 39–117)
ALT SERPL W P-5'-P-CCNC: 25 U/L (ref 1–33)
ANION GAP SERPL CALCULATED.3IONS-SCNC: 6.8 MMOL/L (ref 5–15)
AST SERPL-CCNC: 21 U/L (ref 1–32)
BASOPHILS # BLD AUTO: 0.05 10*3/MM3 (ref 0–0.2)
BASOPHILS NFR BLD AUTO: 0.3 % (ref 0–1.5)
BILIRUB SERPL-MCNC: 0.3 MG/DL (ref 0–1.2)
BUN SERPL-MCNC: 7 MG/DL (ref 6–20)
BUN/CREAT SERPL: 8 (ref 7–25)
CALCIUM SPEC-SCNC: 10.4 MG/DL (ref 8.6–10.5)
CHLORIDE SERPL-SCNC: 103 MMOL/L (ref 98–107)
CHOLEST SERPL-MCNC: 288 MG/DL (ref 0–200)
CO2 SERPL-SCNC: 27.2 MMOL/L (ref 22–29)
CREAT SERPL-MCNC: 0.88 MG/DL (ref 0.57–1)
DEPRECATED RDW RBC AUTO: 43.5 FL (ref 37–54)
EOSINOPHIL # BLD AUTO: 0.08 10*3/MM3 (ref 0–0.4)
EOSINOPHIL NFR BLD AUTO: 0.6 % (ref 0.3–6.2)
ERYTHROCYTE [DISTWIDTH] IN BLOOD BY AUTOMATED COUNT: 12.4 % (ref 12.3–15.4)
GFR SERPL CREATININE-BSD FRML MDRD: 67 ML/MIN/1.73
GLOBULIN UR ELPH-MCNC: 3 GM/DL
GLUCOSE SERPL-MCNC: 85 MG/DL (ref 65–99)
HCT VFR BLD AUTO: 44.6 % (ref 34–46.6)
HDLC SERPL-MCNC: 48 MG/DL (ref 40–60)
HGB BLD-MCNC: 15.7 G/DL (ref 12–15.9)
IMM GRANULOCYTES # BLD AUTO: 0.09 10*3/MM3 (ref 0–0.05)
IMM GRANULOCYTES NFR BLD AUTO: 0.6 % (ref 0–0.5)
LDLC SERPL CALC-MCNC: 200 MG/DL (ref 0–100)
LDLC/HDLC SERPL: 4.14 {RATIO}
LYMPHOCYTES # BLD AUTO: 4.4 10*3/MM3 (ref 0.7–3.1)
LYMPHOCYTES NFR BLD AUTO: 30.3 % (ref 19.6–45.3)
MCH RBC QN AUTO: 34.2 PG (ref 26.6–33)
MCHC RBC AUTO-ENTMCNC: 35.2 G/DL (ref 31.5–35.7)
MCV RBC AUTO: 97.2 FL (ref 79–97)
MONOCYTES # BLD AUTO: 1 10*3/MM3 (ref 0.1–0.9)
MONOCYTES NFR BLD AUTO: 6.9 % (ref 5–12)
NEUTROPHILS NFR BLD AUTO: 61.3 % (ref 42.7–76)
NEUTROPHILS NFR BLD AUTO: 8.92 10*3/MM3 (ref 1.7–7)
NRBC BLD AUTO-RTO: 0 /100 WBC (ref 0–0.2)
PLATELET # BLD AUTO: 248 10*3/MM3 (ref 140–450)
PMV BLD AUTO: 12.3 FL (ref 6–12)
POTASSIUM SERPL-SCNC: 5.5 MMOL/L (ref 3.5–5.2)
PROT SERPL-MCNC: 7.6 G/DL (ref 6–8.5)
RBC # BLD AUTO: 4.59 10*6/MM3 (ref 3.77–5.28)
SODIUM SERPL-SCNC: 137 MMOL/L (ref 136–145)
TRIGL SERPL-MCNC: 206 MG/DL (ref 0–150)
TSH SERPL DL<=0.05 MIU/L-ACNC: 1.88 UIU/ML (ref 0.27–4.2)
VLDLC SERPL-MCNC: 40 MG/DL (ref 5–40)
WBC # BLD AUTO: 14.54 10*3/MM3 (ref 3.4–10.8)

## 2021-11-02 PROCEDURE — 99213 OFFICE O/P EST LOW 20 MIN: CPT | Performed by: NURSE PRACTITIONER

## 2021-11-02 PROCEDURE — 85025 COMPLETE CBC W/AUTO DIFF WBC: CPT | Performed by: NURSE PRACTITIONER

## 2021-11-02 PROCEDURE — 96372 THER/PROPH/DIAG INJ SC/IM: CPT | Performed by: NURSE PRACTITIONER

## 2021-11-02 PROCEDURE — 80061 LIPID PANEL: CPT | Performed by: NURSE PRACTITIONER

## 2021-11-02 PROCEDURE — 84443 ASSAY THYROID STIM HORMONE: CPT | Performed by: NURSE PRACTITIONER

## 2021-11-02 PROCEDURE — 99396 PREV VISIT EST AGE 40-64: CPT | Performed by: NURSE PRACTITIONER

## 2021-11-02 PROCEDURE — 80053 COMPREHEN METABOLIC PANEL: CPT | Performed by: NURSE PRACTITIONER

## 2021-11-02 RX ORDER — CYCLOBENZAPRINE HCL 10 MG
10 TABLET ORAL 3 TIMES DAILY PRN
Qty: 90 TABLET | Refills: 2 | Status: SHIPPED | OUTPATIENT
Start: 2021-11-02

## 2021-11-02 RX ORDER — CETIRIZINE HYDROCHLORIDE 10 MG/1
1 TABLET ORAL DAILY
COMMUNITY

## 2021-11-02 RX ORDER — CLONAZEPAM 0.5 MG/1
1 TABLET ORAL 2 TIMES DAILY
COMMUNITY
End: 2022-05-02 | Stop reason: SDUPTHER

## 2021-11-02 RX ORDER — PROPRANOLOL HYDROCHLORIDE 40 MG/1
80 TABLET ORAL DAILY
Qty: 90 TABLET | Refills: 0 | Status: SHIPPED | OUTPATIENT
Start: 2021-11-02 | End: 2022-01-27

## 2021-11-02 RX ORDER — DIPHENHYDRAMINE HYDROCHLORIDE 50 MG/ML
25 INJECTION INTRAMUSCULAR; INTRAVENOUS ONCE
Status: COMPLETED | OUTPATIENT
Start: 2021-11-02 | End: 2021-11-02

## 2021-11-02 RX ORDER — KETOROLAC TROMETHAMINE 30 MG/ML
60 INJECTION, SOLUTION INTRAMUSCULAR; INTRAVENOUS ONCE
Status: COMPLETED | OUTPATIENT
Start: 2021-11-02 | End: 2021-11-02

## 2021-11-02 RX ORDER — BUTALBITAL, ACETAMINOPHEN AND CAFFEINE 50; 325; 40 MG/1; MG/1; MG/1
1 TABLET ORAL EVERY 4 HOURS PRN
COMMUNITY
End: 2022-11-15

## 2021-11-02 RX ORDER — CYCLOBENZAPRINE HCL 10 MG
10 TABLET ORAL 3 TIMES DAILY PRN
COMMUNITY
End: 2021-11-02 | Stop reason: SDUPTHER

## 2021-11-02 RX ORDER — PROMETHAZINE HYDROCHLORIDE 25 MG/ML
25 INJECTION, SOLUTION INTRAMUSCULAR; INTRAVENOUS ONCE
Status: COMPLETED | OUTPATIENT
Start: 2021-11-02 | End: 2021-11-02

## 2021-11-02 RX ADMIN — PROMETHAZINE HYDROCHLORIDE 25 MG: 25 INJECTION, SOLUTION INTRAMUSCULAR; INTRAVENOUS at 12:44

## 2021-11-02 RX ADMIN — KETOROLAC TROMETHAMINE 60 MG: 30 INJECTION, SOLUTION INTRAMUSCULAR; INTRAVENOUS at 12:42

## 2021-11-02 RX ADMIN — DIPHENHYDRAMINE HYDROCHLORIDE 25 MG: 50 INJECTION INTRAMUSCULAR; INTRAVENOUS at 12:42

## 2021-11-02 NOTE — ASSESSMENT & PLAN NOTE
Discussed new medications such as Emgality.  She prefers to see neurology at this time to discuss other treatment options.  We will refer her propanolol today.  Discussed continued need to monitor heart rate and to stop taking if lower than 60.

## 2021-11-02 NOTE — PROGRESS NOTES
"Chief Complaint  Med Refill and Migraine  Annual physical  Subjective          Isabelmin Corral presents to [facility]  History of Present Illness  Refuses mammogram due to having implants  Willing to have colonoscopy  Does not wish to have Covid vaccines and reports having Covid twice already  Flu shot declined    Migraines-she reports having 15-20 headache days per month.  She states that the Fioricet is not working well for this.  She has been on 80 mg of propanolol which she feels like helps.  She recently back down to 40 mg and noticed a significant worsening in her headaches.  She states that she has also been monitoring her blood pressure and heart rate with the propanolol.  Denies change in quality of headaches.  Headache today x3 days.  She is requesting headache cocktail today.      Objective   Vital Signs:   /74   Pulse 61   Temp 97.7 °F (36.5 °C) (Temporal)   Ht 162.6 cm (64\")   Wt 72.8 kg (160 lb 8 oz)   SpO2 98%   BMI 27.55 kg/m²     Physical Exam   Result Review :     [facility] 11/18/20   BUN 6   Creatinine 0.72   Sodium 138   Potassium 4.4   Chloride 102   CO2 25   Calcium 10.0              Procedures      Assessment and Plan    Diagnoses and all orders for this visit:    1. Annual physical exam (Primary)  -     Comprehensive Metabolic Panel  -     CBC & Differential  -     TSH  -     Lipid Panel    2. Chronic migraine without aura without status migrainosus, not intractable  Assessment & Plan:  Discussed new medications such as Emgality.  She prefers to see neurology at this time to discuss other treatment options.  We will refer her propanolol today.  Discussed continued need to monitor heart rate and to stop taking if lower than 60.      Orders:  -     Ambulatory Referral to Neurology    3. Encounter for screening colonoscopy  -     Ambulatory Referral For Screening Colonoscopy    4. Gastroesophageal reflux disease, unspecified " whether esophagitis present    Other orders  -     propranolol (INDERAL) 40 MG tablet; Take 2 tablets by mouth Daily.  Dispense: 90 tablet; Refill: 0  -     cyclobenzaprine (FLEXERIL) 10 MG tablet; Take 1 tablet by mouth 3 (Three) Times a Day As Needed for Muscle Spasms.  Dispense: 90 tablet; Refill: 2      40 to 64: Counseling/Anticipatory Guidance Discussed: nutrition, physical activity, healthy weight, injury prevention, mental health, immunizations and screenings      Follow Up   Return in about 6 months (around 5/2/2022).  Patient was given instructions and counseling regarding her condition or for health maintenance advice. Please see specific information pulled into the AVS if appropriate.

## 2021-11-03 RX ORDER — ATORVASTATIN CALCIUM 10 MG/1
10 TABLET, FILM COATED ORAL DAILY
Qty: 90 TABLET | Refills: 0 | Status: SHIPPED | OUTPATIENT
Start: 2021-11-03 | End: 2022-01-27

## 2022-01-04 ENCOUNTER — OFFICE VISIT (OUTPATIENT)
Dept: NEUROLOGY | Facility: CLINIC | Age: 52
End: 2022-01-04

## 2022-01-04 VITALS
HEIGHT: 64 IN | DIASTOLIC BLOOD PRESSURE: 74 MMHG | SYSTOLIC BLOOD PRESSURE: 100 MMHG | WEIGHT: 163.6 LBS | HEART RATE: 104 BPM | BODY MASS INDEX: 27.93 KG/M2

## 2022-01-04 DIAGNOSIS — G43.709 CHRONIC MIGRAINE WITHOUT AURA WITHOUT STATUS MIGRAINOSUS, NOT INTRACTABLE: Primary | ICD-10-CM

## 2022-01-04 PROCEDURE — 99215 OFFICE O/P EST HI 40 MIN: CPT | Performed by: NURSE PRACTITIONER

## 2022-01-04 RX ORDER — PSEUDOEPHEDRINE HCL 30 MG
30 TABLET ORAL EVERY 6 HOURS PRN
COMMUNITY

## 2022-01-04 RX ORDER — ACETAMINOPHEN 500 MG
500 TABLET ORAL EVERY 6 HOURS PRN
COMMUNITY

## 2022-01-04 RX ORDER — IBUPROFEN 200 MG
200 TABLET ORAL EVERY 8 HOURS PRN
COMMUNITY

## 2022-01-04 NOTE — PROGRESS NOTES
"Chief Complaint  Migraine    Subjective          Isabelmin Corral presents to [facility]  She reports that she developed headaches many years ago. Since that time, her headaches have progressively worsened.   Currently, she reports headaches that are located occipital . She characterizes the headaches as 9/10 in severity, throbbing  in nature with associated photophobia, phonophobia and nausea. She reports headaches last 8+ hours. She reports 16+ headache days per month. She denies associated aura. She denies focal numbness, weakness, speech and vision changes.   Triggers: stress, odors and weather   Symptoms improved by: Dark quiet room and Sleep   She states she is sleeping poorly. Reports getting 5 hours of sleep per night. endorses snoring. Reports unrefreshing sleep.   Prior prophylactic medications include: propranolol, topiramate, amitriptyline,   She  uses abortive therapy such as: zomig, fioricet, imitrex,   Caffeine Use: 0 servings daily  Childbearing potential : post menopausal  History of Kidney Stones: No  She denies a family history of cerebral aneurysm.       Objective   Vital Signs:   /74   Pulse 104   Ht 162.6 cm (64\")   Wt 74.2 kg (163 lb 9.6 oz)   BMI 28.08 kg/m²     Physical Exam  HENT:      Head: Normocephalic.   Pulmonary:      Effort: Pulmonary effort is normal.   Neurological:      Mental Status: She is alert and oriented to person, place, and time.      Cranial Nerves: Cranial nerves are intact.      Sensory: Sensation is intact.      Motor: Motor function is intact.      Coordination: Coordination is intact.      Deep Tendon Reflexes: Reflexes are normal and symmetric.        Neurologic Exam     Mental Status   Oriented to person, place, and time.        Result Review :               Assessment and Plan    Diagnoses and all orders for this visit:    1. Chronic migraine without aura without status migrainosus, not intractable " (Primary)  Assessment & Plan:  Continue propranolol.  Will start Botox for preventative therapy of migraine.  Will start Nurtec PRN for abortive therapy.           Other orders  -     Rimegepant Sulfate (Nurtec) 75 MG tablet dispersible tablet; Take 1 tablet by mouth Daily As Needed (migraine).  Dispense: 8 tablet; Refill: 3    I spent 45 minutes caring for Isabel on this date of service. This time includes time spent by me in the following activities:preparing for the visit, reviewing tests, obtaining and/or reviewing a separately obtained history, performing a medically appropriate examination and/or evaluation , counseling and educating the patient/family/caregiver, ordering medications, tests, or procedures, documenting information in the medical record and independently interpreting results and communicating that information with the patient/family/caregiver  Follow Up   No follow-ups on file.  Patient was given instructions and counseling regarding her condition or for health maintenance advice. Please see specific information pulled into the AVS if appropriate.

## 2022-01-05 RX ORDER — RIMEGEPANT SULFATE 75 MG/75MG
75 TABLET, ORALLY DISINTEGRATING ORAL DAILY PRN
Qty: 8 TABLET | Refills: 3 | Status: SHIPPED | OUTPATIENT
Start: 2022-01-05 | End: 2022-05-02 | Stop reason: SDUPTHER

## 2022-01-05 NOTE — ASSESSMENT & PLAN NOTE
Continue propranolol.  Will start Botox for preventative therapy of migraine.  Will start Nurtec PRN for abortive therapy.

## 2022-01-25 ENCOUNTER — PRIOR AUTHORIZATION (OUTPATIENT)
Dept: NEUROLOGY | Facility: CLINIC | Age: 52
End: 2022-01-25

## 2022-01-27 ENCOUNTER — TELEPHONE (OUTPATIENT)
Dept: INTERNAL MEDICINE | Facility: CLINIC | Age: 52
End: 2022-01-27

## 2022-01-27 DIAGNOSIS — E78.2 MIXED HYPERLIPIDEMIA: ICD-10-CM

## 2022-01-27 RX ORDER — PROPRANOLOL HYDROCHLORIDE 40 MG/1
TABLET ORAL
Qty: 90 TABLET | Refills: 1 | Status: SHIPPED | OUTPATIENT
Start: 2022-01-27 | End: 2022-01-27

## 2022-01-27 RX ORDER — ATORVASTATIN CALCIUM 10 MG/1
TABLET, FILM COATED ORAL
Qty: 90 TABLET | Refills: 1 | Status: SHIPPED | OUTPATIENT
Start: 2022-01-27 | End: 2022-01-27 | Stop reason: SDUPTHER

## 2022-01-27 RX ORDER — PSEUDOEPHEDRINE HCL 30 MG
30 TABLET ORAL EVERY 6 HOURS PRN
Status: CANCELLED | OUTPATIENT
Start: 2022-01-27

## 2022-01-27 RX ORDER — PROPRANOLOL HYDROCHLORIDE 80 MG/1
80 TABLET ORAL DAILY
Qty: 90 TABLET | Refills: 1 | Status: SHIPPED | OUTPATIENT
Start: 2022-01-27 | End: 2022-05-02 | Stop reason: SDUPTHER

## 2022-01-27 RX ORDER — ATORVASTATIN CALCIUM 10 MG/1
10 TABLET, FILM COATED ORAL DAILY
Qty: 90 TABLET | Refills: 1 | Status: SHIPPED | OUTPATIENT
Start: 2022-01-27 | End: 2022-05-02

## 2022-01-27 NOTE — TELEPHONE ENCOUNTER
Spoke with patient she is requesting a refill for her Propranolol. She wanted to know if she could get a refill with it stating 80 MG QD instead of doing the 40 MG BID. Pended medication for Karina Tee approval.

## 2022-01-27 NOTE — TELEPHONE ENCOUNTER
Caller: Isabel Corral    Relationship: Self    Best call back number: [phone]    Requested Prescriptions:   atorvastatin (Lipitor) 10 MG tablet  Requested Prescriptions     Pending Prescriptions Disp Refills   • pseudoephedrine (SUDAFED) 30 MG tablet       Sig: Take 1 tablet by mouth Every 6 (Six) Hours As Needed for Congestion.        [facility] [phone] [facility] [phone] FX     Additional details provided by patient: PATIENT NEEDS THIS AS A 90 DAY SUPPLY PATIENT WAS GIVEN A REFILL OF THIS MEDICATION BUT GOT 45 DAY SUPPLY BECAUSE SHE TAKES 2 TABLETS A DAY NOT ONE.     Does the patient have less than a 3 day supply:  [x] Yes  [] No    Jase Sweeney Rep   01/27/22 10:21 EST

## 2022-01-31 ENCOUNTER — CLINICAL SUPPORT (OUTPATIENT)
Dept: INTERNAL MEDICINE | Facility: CLINIC | Age: 52
End: 2022-01-31

## 2022-01-31 DIAGNOSIS — D72.829 LEUKOCYTOSIS, UNSPECIFIED TYPE: ICD-10-CM

## 2022-01-31 DIAGNOSIS — E78.2 MIXED HYPERLIPIDEMIA: ICD-10-CM

## 2022-01-31 LAB
ALBUMIN SERPL-MCNC: 4.5 G/DL (ref 3.5–5.2)
ALBUMIN/GLOB SERPL: 1.7 G/DL
ALP SERPL-CCNC: 116 U/L (ref 39–117)
ALT SERPL W P-5'-P-CCNC: 22 U/L (ref 1–33)
ANION GAP SERPL CALCULATED.3IONS-SCNC: 9.2 MMOL/L (ref 5–15)
AST SERPL-CCNC: 17 U/L (ref 1–32)
BASOPHILS # BLD AUTO: 0.05 10*3/MM3 (ref 0–0.2)
BASOPHILS NFR BLD AUTO: 0.4 % (ref 0–1.5)
BILIRUB SERPL-MCNC: 0.3 MG/DL (ref 0–1.2)
BUN SERPL-MCNC: 11 MG/DL (ref 6–20)
BUN/CREAT SERPL: 13.4 (ref 7–25)
CALCIUM SPEC-SCNC: 10.1 MG/DL (ref 8.6–10.5)
CHLORIDE SERPL-SCNC: 106 MMOL/L (ref 98–107)
CHOLEST SERPL-MCNC: 199 MG/DL (ref 0–200)
CO2 SERPL-SCNC: 24.8 MMOL/L (ref 22–29)
CREAT SERPL-MCNC: 0.82 MG/DL (ref 0.57–1)
DEPRECATED RDW RBC AUTO: 46.9 FL (ref 37–54)
EOSINOPHIL # BLD AUTO: 0.12 10*3/MM3 (ref 0–0.4)
EOSINOPHIL NFR BLD AUTO: 1.1 % (ref 0.3–6.2)
ERYTHROCYTE [DISTWIDTH] IN BLOOD BY AUTOMATED COUNT: 12.4 % (ref 12.3–15.4)
GFR SERPL CREATININE-BSD FRML MDRD: 73 ML/MIN/1.73
GLOBULIN UR ELPH-MCNC: 2.7 GM/DL
GLUCOSE SERPL-MCNC: 109 MG/DL (ref 65–99)
HCT VFR BLD AUTO: 44.3 % (ref 34–46.6)
HDLC SERPL-MCNC: 46 MG/DL (ref 40–60)
HGB BLD-MCNC: 15.1 G/DL (ref 12–15.9)
IMM GRANULOCYTES # BLD AUTO: 0.04 10*3/MM3 (ref 0–0.05)
IMM GRANULOCYTES NFR BLD AUTO: 0.4 % (ref 0–0.5)
LDLC SERPL CALC-MCNC: 123 MG/DL (ref 0–100)
LDLC/HDLC SERPL: 2.58 {RATIO}
LYMPHOCYTES # BLD AUTO: 4.73 10*3/MM3 (ref 0.7–3.1)
LYMPHOCYTES NFR BLD AUTO: 42.4 % (ref 19.6–45.3)
MCH RBC QN AUTO: 34.5 PG (ref 26.6–33)
MCHC RBC AUTO-ENTMCNC: 34.1 G/DL (ref 31.5–35.7)
MCV RBC AUTO: 101.1 FL (ref 79–97)
MONOCYTES # BLD AUTO: 0.77 10*3/MM3 (ref 0.1–0.9)
MONOCYTES NFR BLD AUTO: 6.9 % (ref 5–12)
NEUTROPHILS NFR BLD AUTO: 48.8 % (ref 42.7–76)
NEUTROPHILS NFR BLD AUTO: 5.44 10*3/MM3 (ref 1.7–7)
NRBC BLD AUTO-RTO: 0 /100 WBC (ref 0–0.2)
PLATELET # BLD AUTO: 208 10*3/MM3 (ref 140–450)
PMV BLD AUTO: 12 FL (ref 6–12)
POTASSIUM SERPL-SCNC: 4.3 MMOL/L (ref 3.5–5.2)
PROT SERPL-MCNC: 7.2 G/DL (ref 6–8.5)
RBC # BLD AUTO: 4.38 10*6/MM3 (ref 3.77–5.28)
SODIUM SERPL-SCNC: 140 MMOL/L (ref 136–145)
TRIGL SERPL-MCNC: 172 MG/DL (ref 0–150)
VLDLC SERPL-MCNC: 30 MG/DL (ref 5–40)
WBC NRBC COR # BLD: 11.15 10*3/MM3 (ref 3.4–10.8)

## 2022-01-31 PROCEDURE — 85025 COMPLETE CBC W/AUTO DIFF WBC: CPT | Performed by: NURSE PRACTITIONER

## 2022-01-31 PROCEDURE — 36415 COLL VENOUS BLD VENIPUNCTURE: CPT | Performed by: NURSE PRACTITIONER

## 2022-01-31 PROCEDURE — 80061 LIPID PANEL: CPT | Performed by: NURSE PRACTITIONER

## 2022-01-31 PROCEDURE — 80053 COMPREHEN METABOLIC PANEL: CPT | Performed by: NURSE PRACTITIONER

## 2022-03-07 RX ORDER — RIMEGEPANT SULFATE 75 MG/75MG
TABLET, ORALLY DISINTEGRATING ORAL
Qty: 8 TABLET | Refills: 3 | OUTPATIENT
Start: 2022-03-07

## 2022-03-07 NOTE — TELEPHONE ENCOUNTER
"Refill denied. 30 day supply +3 refills sent in on 01/05/2022. \"E-Prescribing Status: Receipt confirmed by pharmacy (1/5/2022  7:40 AM EST)\".   "

## 2022-05-02 ENCOUNTER — OFFICE VISIT (OUTPATIENT)
Dept: INTERNAL MEDICINE | Facility: CLINIC | Age: 52
End: 2022-05-02

## 2022-05-02 VITALS
BODY MASS INDEX: 28.24 KG/M2 | DIASTOLIC BLOOD PRESSURE: 62 MMHG | OXYGEN SATURATION: 97 % | WEIGHT: 165.4 LBS | RESPIRATION RATE: 18 BRPM | HEART RATE: 81 BPM | SYSTOLIC BLOOD PRESSURE: 104 MMHG | TEMPERATURE: 97.4 F | HEIGHT: 64 IN

## 2022-05-02 DIAGNOSIS — H93.13 TINNITUS OF BOTH EARS: ICD-10-CM

## 2022-05-02 DIAGNOSIS — G43.709 CHRONIC MIGRAINE WITHOUT AURA WITHOUT STATUS MIGRAINOSUS, NOT INTRACTABLE: ICD-10-CM

## 2022-05-02 DIAGNOSIS — Z79.899 MEDICATION MANAGEMENT: ICD-10-CM

## 2022-05-02 DIAGNOSIS — F17.200 TOBACCO DEPENDENCE: ICD-10-CM

## 2022-05-02 DIAGNOSIS — R23.2 HOT FLASHES: ICD-10-CM

## 2022-05-02 DIAGNOSIS — E78.2 MIXED HYPERLIPIDEMIA: ICD-10-CM

## 2022-05-02 DIAGNOSIS — F41.9 ANXIETY: Primary | ICD-10-CM

## 2022-05-02 LAB
ALBUMIN SERPL-MCNC: 4.5 G/DL (ref 3.5–5.2)
ALBUMIN/GLOB SERPL: 1.5 G/DL
ALP SERPL-CCNC: 124 U/L (ref 39–117)
ALT SERPL W P-5'-P-CCNC: 24 U/L (ref 1–33)
ANION GAP SERPL CALCULATED.3IONS-SCNC: 10.7 MMOL/L (ref 5–15)
AST SERPL-CCNC: 18 U/L (ref 1–32)
BASOPHILS # BLD AUTO: 0.04 10*3/MM3 (ref 0–0.2)
BASOPHILS NFR BLD AUTO: 0.4 % (ref 0–1.5)
BILIRUB SERPL-MCNC: 0.2 MG/DL (ref 0–1.2)
BUN SERPL-MCNC: 9 MG/DL (ref 6–20)
BUN/CREAT SERPL: 10.7 (ref 7–25)
CALCIUM SPEC-SCNC: 10.4 MG/DL (ref 8.6–10.5)
CHLORIDE SERPL-SCNC: 105 MMOL/L (ref 98–107)
CHOLEST SERPL-MCNC: 309 MG/DL (ref 0–200)
CO2 SERPL-SCNC: 23.3 MMOL/L (ref 22–29)
CREAT SERPL-MCNC: 0.84 MG/DL (ref 0.57–1)
DEPRECATED RDW RBC AUTO: 43.8 FL (ref 37–54)
EGFRCR SERPLBLD CKD-EPI 2021: 83.7 ML/MIN/1.73
EOSINOPHIL # BLD AUTO: 0.08 10*3/MM3 (ref 0–0.4)
EOSINOPHIL NFR BLD AUTO: 0.8 % (ref 0.3–6.2)
ERYTHROCYTE [DISTWIDTH] IN BLOOD BY AUTOMATED COUNT: 12.2 % (ref 12.3–15.4)
GLOBULIN UR ELPH-MCNC: 3.1 GM/DL
GLUCOSE SERPL-MCNC: 107 MG/DL (ref 65–99)
HCT VFR BLD AUTO: 45.4 % (ref 34–46.6)
HDLC SERPL-MCNC: 50 MG/DL (ref 40–60)
HGB BLD-MCNC: 15.5 G/DL (ref 12–15.9)
IMM GRANULOCYTES # BLD AUTO: 0.05 10*3/MM3 (ref 0–0.05)
IMM GRANULOCYTES NFR BLD AUTO: 0.5 % (ref 0–0.5)
LDLC SERPL CALC-MCNC: 229 MG/DL (ref 0–100)
LDLC/HDLC SERPL: 4.55 {RATIO}
LYMPHOCYTES # BLD AUTO: 3.46 10*3/MM3 (ref 0.7–3.1)
LYMPHOCYTES NFR BLD AUTO: 34.6 % (ref 19.6–45.3)
MCH RBC QN AUTO: 33.8 PG (ref 26.6–33)
MCHC RBC AUTO-ENTMCNC: 34.1 G/DL (ref 31.5–35.7)
MCV RBC AUTO: 99.1 FL (ref 79–97)
MONOCYTES # BLD AUTO: 0.72 10*3/MM3 (ref 0.1–0.9)
MONOCYTES NFR BLD AUTO: 7.2 % (ref 5–12)
NEUTROPHILS NFR BLD AUTO: 5.66 10*3/MM3 (ref 1.7–7)
NEUTROPHILS NFR BLD AUTO: 56.5 % (ref 42.7–76)
NRBC BLD AUTO-RTO: 0 /100 WBC (ref 0–0.2)
PLATELET # BLD AUTO: 258 10*3/MM3 (ref 140–450)
PMV BLD AUTO: 11.9 FL (ref 6–12)
POTASSIUM SERPL-SCNC: 4.3 MMOL/L (ref 3.5–5.2)
PROT SERPL-MCNC: 7.6 G/DL (ref 6–8.5)
RBC # BLD AUTO: 4.58 10*6/MM3 (ref 3.77–5.28)
SODIUM SERPL-SCNC: 139 MMOL/L (ref 136–145)
TRIGL SERPL-MCNC: 158 MG/DL (ref 0–150)
TSH SERPL DL<=0.05 MIU/L-ACNC: 1.87 UIU/ML (ref 0.27–4.2)
VLDLC SERPL-MCNC: 30 MG/DL (ref 5–40)
WBC NRBC COR # BLD: 10.01 10*3/MM3 (ref 3.4–10.8)

## 2022-05-02 PROCEDURE — 99214 OFFICE O/P EST MOD 30 MIN: CPT | Performed by: NURSE PRACTITIONER

## 2022-05-02 PROCEDURE — 80061 LIPID PANEL: CPT | Performed by: NURSE PRACTITIONER

## 2022-05-02 PROCEDURE — 80305 DRUG TEST PRSMV DIR OPT OBS: CPT | Performed by: NURSE PRACTITIONER

## 2022-05-02 PROCEDURE — 80050 GENERAL HEALTH PANEL: CPT | Performed by: NURSE PRACTITIONER

## 2022-05-02 RX ORDER — CLONAZEPAM 0.5 MG/1
0.5 TABLET ORAL 2 TIMES DAILY
Qty: 10 TABLET | Refills: 0 | Status: SHIPPED | OUTPATIENT
Start: 2022-05-02

## 2022-05-02 RX ORDER — RIMEGEPANT SULFATE 75 MG/75MG
75 TABLET, ORALLY DISINTEGRATING ORAL DAILY PRN
Qty: 8 TABLET | Refills: 3 | Status: SHIPPED | OUTPATIENT
Start: 2022-05-02 | End: 2022-09-08

## 2022-05-02 RX ORDER — PROPRANOLOL HYDROCHLORIDE 80 MG/1
80 TABLET ORAL DAILY
Qty: 90 TABLET | Refills: 1 | Status: SHIPPED | OUTPATIENT
Start: 2022-05-02 | End: 2022-07-08 | Stop reason: SDUPTHER

## 2022-05-02 RX ORDER — CLONAZEPAM 0.5 MG/1
0.5 TABLET ORAL 2 TIMES DAILY
Status: CANCELLED | OUTPATIENT
Start: 2022-05-02

## 2022-05-02 RX ORDER — VENLAFAXINE HYDROCHLORIDE 37.5 MG/1
37.5 CAPSULE, EXTENDED RELEASE ORAL DAILY
Qty: 90 CAPSULE | Refills: 0 | Status: SHIPPED | OUTPATIENT
Start: 2022-05-02 | End: 2022-07-08 | Stop reason: SDUPTHER

## 2022-05-02 NOTE — PROGRESS NOTES
"Chief Complaint  Follow-up (6 Month), Hyperlipidemia, Heartburn, Migraine, Hot Flashes (2 Months worsens), Joint Pain (Statins), and Tinnitus (Bilateral ear ringing for 1 month)    Subjective          Isabel Meredith Corral presents to [facility]  History of Present Illness  Migraines-does not wish to see neurology again b/c she does not want to do botox. She reports nurtec working well    HLD-leg cramps and joint pain. Has stopped the lipitor due to pain    Anxiety-she was getting clonazepam for previous PCP. Panic attacks previously but doing well in the last few years. once or twice every 2 months    Tinnitus-all of the time, worse with migraines  Denies hearing loss    Hot flashes-  Smoking-1/2-1 ppd    Mammogram-she has not done this due to risk to implants  Objective   Vital Signs:   /62 (BP Location: Right arm, Patient Position: Sitting, Cuff Size: Adult)   Pulse 81   Temp 97.4 °F (36.3 °C)   Resp 18   Ht 162.6 cm (64\")   Wt 75 kg (165 lb 6.4 oz)   SpO2 97%   BMI 28.39 kg/m²     Physical Exam  Vitals and nursing note reviewed.   Constitutional:       General: She is not in acute distress.     Appearance: Normal appearance.   HENT:      Head: Normocephalic and atraumatic.      Right Ear: Tympanic membrane, ear canal and external ear normal.      Left Ear: Tympanic membrane, ear canal and external ear normal.      Nose: Nose normal.      Mouth/Throat:      Mouth: Mucous membranes are moist.   Eyes:      Conjunctiva/sclera: Conjunctivae normal.   Cardiovascular:      Rate and Rhythm: Normal rate and regular rhythm.      Pulses: Normal pulses.      Heart sounds: Normal heart sounds. No murmur heard.    No friction rub. No gallop.   Pulmonary:      Effort: Pulmonary effort is normal. No respiratory distress.      Breath sounds: No wheezing, rhonchi or rales.   Musculoskeletal:      Cervical back: Neck supple.      Right lower leg: No edema.      Left " lower leg: No edema.   Lymphadenopathy:      Cervical: No cervical adenopathy.   Skin:     General: Skin is warm and dry.   Neurological:      General: No focal deficit present.      Mental Status: She is alert and oriented to person, place, and time.   Psychiatric:         Mood and Affect: Mood normal.         Behavior: Behavior normal.        Result Review :          Procedures      Assessment and Plan    Diagnoses and all orders for this visit:    1. Anxiety (Primary)  Assessment & Plan:  Hopeful that Effexor may also improve anxiety although she seems to be having this very rarely.  Willing to describe her very small number of Klonopin to use as needed.  UDS, Ramon and controlled substance agreement in the office today.    Orders:  -     POC Urine Drug Screen [facility]    2. Chronic migraine without aura without status migrainosus, not intractable  Comments:  will continue nurtec    3. Tinnitus of both ears  Comments:  Discussed course.  She will let us know if she has hearing issues.    4. Hot flashes  Assessment & Plan:  We will try Effexor for hot flashes.  Discussed risk/side effects associated with SNRI--weight gain, decreased libido, worsening depression and SI.  Patient verbalized understanding of risk and will call with worsening depression or SI or seek treatment emergently.      Orders:  -     TSH  -     CBC Auto Differential    5. Tobacco dependence  Assessment & Plan:  Discussed risks of smoking with the patient including but not limited to death, hypertension, MI, stroke, CKD, blindness, and lung cancer.  Patient is not ready to quit smoking at this time encouraged to call or return to clinic once ready to quit.      6. Medication management  -     POC Urine Drug Screen [facility]    7. Mixed hyperlipidemia  Assessment & Plan:  Was improved with Lipitor but she is not tolerating therapy.  She is also felt Crestor previously.  We will try to get Livalo covered for her.    Orders:  -      Lipid Panel  -     Comprehensive Metabolic Panel    Other orders  -     propranolol (INDERAL) 80 MG tablet; Take 1 tablet by mouth Daily.  Dispense: 90 tablet; Refill: 1  -     Rimegepant Sulfate (Nurtec) 75 MG tablet dispersible tablet; Take 1 tablet by mouth Daily As Needed (migraine).  Dispense: 8 tablet; Refill: 3  -     pitavastatin calcium (Livalo) 2 MG tablet tablet; Take 1 tablet by mouth Every Night.  Dispense: 90 tablet; Refill: 1  -     venlafaxine XR (Effexor XR) 37.5 MG 24 hr capsule; Take 1 capsule by mouth Daily.  Dispense: 90 capsule; Refill: 0  -     clonazePAM (KlonoPIN) 0.5 MG tablet; Take 1 tablet by mouth 2 (Two) Times a Day.  Dispense: 10 tablet; Refill: 0            Follow Up   Return in about 8 weeks (around 6/27/2022).  Patient was given instructions and counseling regarding her condition or for health maintenance advice. Please see specific information pulled into the AVS if appropriate.

## 2022-05-02 NOTE — ASSESSMENT & PLAN NOTE
Hopeful that Effexor may also improve anxiety although she seems to be having this very rarely.  Willing to describe her very small number of Klonopin to use as needed.  Ramon ARNOLD and controlled substance agreement in the office today.

## 2022-05-02 NOTE — ASSESSMENT & PLAN NOTE
Discussed risks of smoking with the patient including but not limited to death, hypertension, MI, stroke, CKD, blindness, and lung cancer.  Patient is not ready to quit smoking at this time encouraged to call or return to clinic once ready to quit.

## 2022-05-02 NOTE — ASSESSMENT & PLAN NOTE
We will try Effexor for hot flashes.  Discussed risk/side effects associated with SNRI--weight gain, decreased libido, worsening depression and SI.  Patient verbalized understanding of risk and will call with worsening depression or SI or seek treatment emergently.

## 2022-05-02 NOTE — ASSESSMENT & PLAN NOTE
Was improved with Lipitor but she is not tolerating therapy.  She is also felt Crestor previously.  We will try to get Livalo covered for her.

## 2022-05-03 ENCOUNTER — PRIOR AUTHORIZATION (OUTPATIENT)
Dept: INTERNAL MEDICINE | Facility: CLINIC | Age: 52
End: 2022-05-03

## 2022-05-13 ENCOUNTER — TELEPHONE (OUTPATIENT)
Dept: INTERNAL MEDICINE | Facility: CLINIC | Age: 52
End: 2022-05-13

## 2022-05-13 NOTE — TELEPHONE ENCOUNTER
Caller: Isabel Corral    Relationship: Self    Best call back number: [phone]    What is the best time to reach you: ANY    Who are you requesting to speak with (clinical staff, provider,  specific staff member): CLINICAL    What was the call regarding: PATIENT STATED THAT SHE HAS CALLED [facility] TODAY AND THEY INFORMED PRIOR AUTHORIZATION IS STILL NEEDED FOR CHOLESTEROL MEDICATION THAT WAS WRITTEN AT 5/2/22 VISIT   [facility] AT [facility] & [address] - [phone] [facility] [phone] FX    Do you require a callback:YES

## 2022-05-17 ENCOUNTER — PRIOR AUTHORIZATION (OUTPATIENT)
Dept: INTERNAL MEDICINE | Facility: CLINIC | Age: 52
End: 2022-05-17

## 2022-05-25 NOTE — TELEPHONE ENCOUNTER
New encounter open with more information, closing current encounter. Will update patient once provider responds in other encounter.

## 2022-07-08 ENCOUNTER — OFFICE VISIT (OUTPATIENT)
Dept: INTERNAL MEDICINE | Facility: CLINIC | Age: 52
End: 2022-07-08

## 2022-07-08 VITALS
HEART RATE: 65 BPM | DIASTOLIC BLOOD PRESSURE: 78 MMHG | SYSTOLIC BLOOD PRESSURE: 114 MMHG | TEMPERATURE: 97.4 F | HEIGHT: 64 IN | OXYGEN SATURATION: 98 % | BODY MASS INDEX: 27.93 KG/M2 | WEIGHT: 163.6 LBS

## 2022-07-08 DIAGNOSIS — F41.9 ANXIETY: ICD-10-CM

## 2022-07-08 DIAGNOSIS — R23.2 HOT FLASHES: Primary | ICD-10-CM

## 2022-07-08 DIAGNOSIS — Z53.20 SCREENING MAMMOGRAPHY DECLINED: ICD-10-CM

## 2022-07-08 DIAGNOSIS — F33.0 MILD EPISODE OF RECURRENT MAJOR DEPRESSIVE DISORDER: ICD-10-CM

## 2022-07-08 DIAGNOSIS — E78.2 MIXED HYPERLIPIDEMIA: ICD-10-CM

## 2022-07-08 DIAGNOSIS — Z12.11 COLON CANCER SCREENING: ICD-10-CM

## 2022-07-08 PROCEDURE — 99214 OFFICE O/P EST MOD 30 MIN: CPT | Performed by: NURSE PRACTITIONER

## 2022-07-08 RX ORDER — VENLAFAXINE HYDROCHLORIDE 37.5 MG/1
37.5 CAPSULE, EXTENDED RELEASE ORAL DAILY
Qty: 90 CAPSULE | Refills: 1 | Status: SHIPPED | OUTPATIENT
Start: 2022-07-08 | End: 2022-11-15 | Stop reason: SDUPTHER

## 2022-07-08 RX ORDER — PRAVASTATIN SODIUM 10 MG
10 TABLET ORAL DAILY
Qty: 90 TABLET | Refills: 1 | Status: SHIPPED | OUTPATIENT
Start: 2022-07-08 | End: 2023-01-04

## 2022-07-08 RX ORDER — PROPRANOLOL HYDROCHLORIDE 80 MG/1
80 TABLET ORAL DAILY
Qty: 90 TABLET | Refills: 1 | Status: SHIPPED | OUTPATIENT
Start: 2022-07-08 | End: 2022-11-15 | Stop reason: SDUPTHER

## 2022-07-08 NOTE — ASSESSMENT & PLAN NOTE
Will try pravastatin at a low dose since she has not been able to tolerate lipitor and crestor. Would like to wait until annual physical for repeat labs, in 4 mths

## 2022-07-08 NOTE — PROGRESS NOTES
"Chief Complaint  Hyperlipidemia (Patient is here for a 2 month follow up on her cholesterol. Patient states that insurance denied her medication so she has not started it yet.) and Med Refill (Effexor)    Subjective          Isabelmin Corral presents to [facility]  History of Present Illness  Significant improvement in hot flashes and mood since starting effexor  Denies SI   Anxiety/depression-well controlled    She was not able to get livalo covered. Needing to try another low intensity statin first. Failed lipitor and crestor due to severe joint pain    Objective   Vital Signs:   /78 (BP Location: Left arm, Patient Position: Sitting, Cuff Size: Adult)   Pulse 65   Temp 97.4 °F (36.3 °C) (Temporal)   Ht 162.6 cm (64\")   Wt 74.2 kg (163 lb 9.6 oz)   SpO2 98%   BMI 28.08 kg/m²     Physical Exam  Vitals and nursing note reviewed.   Constitutional:       General: She is not in acute distress.     Appearance: Normal appearance.   HENT:      Head: Normocephalic and atraumatic.      Right Ear: External ear normal.      Left Ear: External ear normal.      Nose: Nose normal.      Mouth/Throat:      Mouth: Mucous membranes are moist.   Eyes:      Conjunctiva/sclera: Conjunctivae normal.   Cardiovascular:      Rate and Rhythm: Normal rate and regular rhythm.      Pulses: Normal pulses.      Heart sounds: Normal heart sounds. No murmur heard.    No friction rub. No gallop.   Pulmonary:      Effort: Pulmonary effort is normal. No respiratory distress.      Breath sounds: No wheezing, rhonchi or rales.   Musculoskeletal:      Cervical back: Neck supple.   Skin:     General: Skin is warm and dry.   Neurological:      General: No focal deficit present.      Mental Status: She is alert and oriented to person, place, and time.   Psychiatric:         Mood and Affect: Mood normal.         Behavior: Behavior normal.        Result Review :          Procedures    "   Assessment and Plan    Diagnoses and all orders for this visit:    1. Hot flashes (Primary)  Comments:  Significantly improved with Effexor.  Continue current dose.    2. Mixed hyperlipidemia  Assessment & Plan:  Will try pravastatin at a low dose since she has not been able to tolerate lipitor and crestor. Would like to wait until annual physical for repeat labs, in 4 mths      3. Colon cancer screening  -     Cologuard - Stool, Per Rectum; Future    4. Anxiety  Comments:  Controlled.  Continue current meds    5. Mild episode of recurrent major depressive disorder (HCC)  Comments:  Well-controlled.  Continue Effexor.    6. Screening mammography declined  Comments:  due to breast implants and risk for rupture    Other orders  -     venlafaxine XR (Effexor XR) 37.5 MG 24 hr capsule; Take 1 capsule by mouth Daily.  Dispense: 90 capsule; Refill: 1  -     pravastatin (PRAVACHOL) 10 MG tablet; Take 1 tablet by mouth Daily.  Dispense: 90 tablet; Refill: 1  -     propranolol (INDERAL) 80 MG tablet; Take 1 tablet by mouth Daily.  Dispense: 90 tablet; Refill: 1            Follow Up   Return in about 4 months (around 11/8/2022) for Annual physical in November.  Patient was given instructions and counseling regarding her condition or for health maintenance advice. Please see specific information pulled into the AVS if appropriate.

## 2022-09-08 RX ORDER — RIMEGEPANT SULFATE 75 MG/75MG
TABLET, ORALLY DISINTEGRATING ORAL
Qty: 8 TABLET | Refills: 3 | Status: SHIPPED | OUTPATIENT
Start: 2022-09-08 | End: 2023-01-10

## 2022-11-15 ENCOUNTER — OFFICE VISIT (OUTPATIENT)
Dept: INTERNAL MEDICINE | Facility: CLINIC | Age: 52
End: 2022-11-15

## 2022-11-15 VITALS
DIASTOLIC BLOOD PRESSURE: 62 MMHG | BODY MASS INDEX: 27.49 KG/M2 | RESPIRATION RATE: 18 BRPM | WEIGHT: 161 LBS | SYSTOLIC BLOOD PRESSURE: 108 MMHG | TEMPERATURE: 97.5 F | OXYGEN SATURATION: 97 % | HEIGHT: 64 IN | HEART RATE: 87 BPM

## 2022-11-15 DIAGNOSIS — R73.09 ELEVATED GLUCOSE: ICD-10-CM

## 2022-11-15 DIAGNOSIS — Z00.00 ANNUAL PHYSICAL EXAM: Primary | ICD-10-CM

## 2022-11-15 DIAGNOSIS — F41.9 ANXIETY: ICD-10-CM

## 2022-11-15 DIAGNOSIS — F33.0 MILD EPISODE OF RECURRENT MAJOR DEPRESSIVE DISORDER: ICD-10-CM

## 2022-11-15 DIAGNOSIS — Z11.59 NEED FOR HEPATITIS C SCREENING TEST: ICD-10-CM

## 2022-11-15 DIAGNOSIS — Z23 NEED FOR INFLUENZA VACCINATION: ICD-10-CM

## 2022-11-15 DIAGNOSIS — M54.50 ACUTE BILATERAL LOW BACK PAIN WITHOUT SCIATICA: ICD-10-CM

## 2022-11-15 DIAGNOSIS — E78.2 MIXED HYPERLIPIDEMIA: ICD-10-CM

## 2022-11-15 LAB
ALBUMIN SERPL-MCNC: 4.1 G/DL (ref 3.5–5.2)
ALBUMIN/GLOB SERPL: 1.4 G/DL
ALP SERPL-CCNC: 120 U/L (ref 39–117)
ALT SERPL W P-5'-P-CCNC: 17 U/L (ref 1–33)
ANION GAP SERPL CALCULATED.3IONS-SCNC: 8.4 MMOL/L (ref 5–15)
AST SERPL-CCNC: 16 U/L (ref 1–32)
BASOPHILS # BLD AUTO: 0.03 10*3/MM3 (ref 0–0.2)
BASOPHILS NFR BLD AUTO: 0.4 % (ref 0–1.5)
BILIRUB SERPL-MCNC: 0.2 MG/DL (ref 0–1.2)
BILIRUB UR QL STRIP: NEGATIVE
BUN SERPL-MCNC: 9 MG/DL (ref 6–20)
BUN/CREAT SERPL: 11.5 (ref 7–25)
CALCIUM SPEC-SCNC: 9.7 MG/DL (ref 8.6–10.5)
CHLORIDE SERPL-SCNC: 106 MMOL/L (ref 98–107)
CHOLEST SERPL-MCNC: 206 MG/DL (ref 0–200)
CLARITY UR: ABNORMAL
CO2 SERPL-SCNC: 23.6 MMOL/L (ref 22–29)
COLOR UR: YELLOW
CREAT SERPL-MCNC: 0.78 MG/DL (ref 0.57–1)
DEPRECATED RDW RBC AUTO: 43.4 FL (ref 37–54)
EGFRCR SERPLBLD CKD-EPI 2021: 91 ML/MIN/1.73
EOSINOPHIL # BLD AUTO: 0.11 10*3/MM3 (ref 0–0.4)
EOSINOPHIL NFR BLD AUTO: 1.4 % (ref 0.3–6.2)
ERYTHROCYTE [DISTWIDTH] IN BLOOD BY AUTOMATED COUNT: 11.9 % (ref 12.3–15.4)
GLOBULIN UR ELPH-MCNC: 2.9 GM/DL
GLUCOSE SERPL-MCNC: 97 MG/DL (ref 65–99)
GLUCOSE UR STRIP-MCNC: NEGATIVE MG/DL
HBA1C MFR BLD: 5.8 % (ref 4.8–5.6)
HCT VFR BLD AUTO: 43.1 % (ref 34–46.6)
HCV AB SER DONR QL: NORMAL
HDLC SERPL-MCNC: 50 MG/DL (ref 40–60)
HGB BLD-MCNC: 14.3 G/DL (ref 12–15.9)
HGB UR QL STRIP.AUTO: NEGATIVE
IMM GRANULOCYTES # BLD AUTO: 0.06 10*3/MM3 (ref 0–0.05)
IMM GRANULOCYTES NFR BLD AUTO: 0.8 % (ref 0–0.5)
KETONES UR QL STRIP: NEGATIVE
LDLC SERPL CALC-MCNC: 132 MG/DL (ref 0–100)
LDLC/HDLC SERPL: 2.59 {RATIO}
LEUKOCYTE ESTERASE UR QL STRIP.AUTO: NEGATIVE
LYMPHOCYTES # BLD AUTO: 2.77 10*3/MM3 (ref 0.7–3.1)
LYMPHOCYTES NFR BLD AUTO: 35.2 % (ref 19.6–45.3)
MCH RBC QN AUTO: 33 PG (ref 26.6–33)
MCHC RBC AUTO-ENTMCNC: 33.2 G/DL (ref 31.5–35.7)
MCV RBC AUTO: 99.5 FL (ref 79–97)
MONOCYTES # BLD AUTO: 0.54 10*3/MM3 (ref 0.1–0.9)
MONOCYTES NFR BLD AUTO: 6.9 % (ref 5–12)
NEUTROPHILS NFR BLD AUTO: 4.35 10*3/MM3 (ref 1.7–7)
NEUTROPHILS NFR BLD AUTO: 55.3 % (ref 42.7–76)
NITRITE UR QL STRIP: NEGATIVE
NRBC BLD AUTO-RTO: 0 /100 WBC (ref 0–0.2)
PH UR STRIP.AUTO: 5.5 [PH] (ref 5–8)
PLATELET # BLD AUTO: 243 10*3/MM3 (ref 140–450)
PMV BLD AUTO: 11.6 FL (ref 6–12)
POTASSIUM SERPL-SCNC: 4.4 MMOL/L (ref 3.5–5.2)
PROT SERPL-MCNC: 7 G/DL (ref 6–8.5)
PROT UR QL STRIP: NEGATIVE
RBC # BLD AUTO: 4.33 10*6/MM3 (ref 3.77–5.28)
SODIUM SERPL-SCNC: 138 MMOL/L (ref 136–145)
SP GR UR STRIP: >=1.03 (ref 1–1.03)
TRIGL SERPL-MCNC: 133 MG/DL (ref 0–150)
TSH SERPL DL<=0.05 MIU/L-ACNC: 1.55 UIU/ML (ref 0.27–4.2)
UROBILINOGEN UR QL STRIP: ABNORMAL
VLDLC SERPL-MCNC: 24 MG/DL (ref 5–40)
WBC NRBC COR # BLD: 7.86 10*3/MM3 (ref 3.4–10.8)

## 2022-11-15 PROCEDURE — 83036 HEMOGLOBIN GLYCOSYLATED A1C: CPT | Performed by: NURSE PRACTITIONER

## 2022-11-15 PROCEDURE — 80061 LIPID PANEL: CPT | Performed by: NURSE PRACTITIONER

## 2022-11-15 PROCEDURE — 99396 PREV VISIT EST AGE 40-64: CPT | Performed by: NURSE PRACTITIONER

## 2022-11-15 PROCEDURE — 90686 IIV4 VACC NO PRSV 0.5 ML IM: CPT | Performed by: NURSE PRACTITIONER

## 2022-11-15 PROCEDURE — 81003 URINALYSIS AUTO W/O SCOPE: CPT | Performed by: NURSE PRACTITIONER

## 2022-11-15 PROCEDURE — 80050 GENERAL HEALTH PANEL: CPT | Performed by: NURSE PRACTITIONER

## 2022-11-15 PROCEDURE — 90471 IMMUNIZATION ADMIN: CPT | Performed by: NURSE PRACTITIONER

## 2022-11-15 PROCEDURE — 86803 HEPATITIS C AB TEST: CPT | Performed by: NURSE PRACTITIONER

## 2022-11-15 RX ORDER — GLUCOSAMINE HCL 500 MG
TABLET ORAL
COMMUNITY

## 2022-11-15 RX ORDER — VENLAFAXINE HYDROCHLORIDE 37.5 MG/1
37.5 CAPSULE, EXTENDED RELEASE ORAL DAILY
Qty: 90 CAPSULE | Refills: 1 | Status: SHIPPED | OUTPATIENT
Start: 2022-11-15

## 2022-11-15 RX ORDER — PROPRANOLOL HYDROCHLORIDE 80 MG/1
80 TABLET ORAL DAILY
Qty: 90 TABLET | Refills: 1 | Status: SHIPPED | OUTPATIENT
Start: 2022-11-15

## 2022-11-15 RX ORDER — PRAVASTATIN SODIUM 10 MG
10 TABLET ORAL DAILY
Qty: 90 TABLET | Refills: 1 | Status: CANCELLED | OUTPATIENT
Start: 2022-11-15

## 2022-11-15 NOTE — PROGRESS NOTES
"Chief Complaint  Annual Exam and Back Pain (Lower pain- 2 days)    Subjective          Isabelmin Corral presents to [facility]  History of Present Illness  Annual physical    Declines mammo    Low back pain  Denies dysuria, urinary frequency, urgency  Reports improvement with heating pad  Reports mild pain  Denies radiation of pain  Not reproducible  Denies fever, body aches, chills    HLD-did not do well on lipitor and crestor  Doing well with co q10 and pravastatin  No leg cramps    Migraines well controlled  Anxiety well controlled    Objective   Vital Signs:   /62 (BP Location: Right arm, Patient Position: Sitting, Cuff Size: Adult)   Pulse 87   Temp 97.5 °F (36.4 °C)   Resp 18   Ht 162.6 cm (64\")   Wt 73 kg (161 lb)   SpO2 97%   BMI 27.64 kg/m²     Physical Exam  Vitals and nursing note reviewed.   Constitutional:       General: She is not in acute distress.     Appearance: Normal appearance.   HENT:      Head: Normocephalic and atraumatic.      Right Ear: External ear normal.      Left Ear: External ear normal.      Nose: Nose normal.      Mouth/Throat:      Mouth: Mucous membranes are moist.   Eyes:      Conjunctiva/sclera: Conjunctivae normal.   Cardiovascular:      Rate and Rhythm: Normal rate and regular rhythm.      Pulses: Normal pulses.      Heart sounds: Normal heart sounds. No murmur heard.    No friction rub. No gallop.   Pulmonary:      Effort: Pulmonary effort is normal. No respiratory distress.      Breath sounds: No wheezing, rhonchi or rales.   Abdominal:      Palpations: There is no mass.      Tenderness: There is no abdominal tenderness. There is right CVA tenderness. There is no left CVA tenderness or guarding.   Musculoskeletal:      Cervical back: Neck supple.      Right lower leg: No edema.      Left lower leg: No edema.   Skin:     General: Skin is warm and dry.   Neurological:      General: No focal deficit present.     "  Mental Status: She is alert and oriented to person, place, and time.   Psychiatric:         Mood and Affect: Mood normal.         Behavior: Behavior normal.        Result Review :          Procedures      Assessment and Plan    Diagnoses and all orders for this visit:    1. Annual physical exam (Primary)  -     Comprehensive Metabolic Panel  -     CBC & Differential  -     TSH  -     Lipid Panel    2. Need for influenza vaccination  -     FluLaval/Fluzone >6 mos (0978-0723)    3. Acute bilateral low back pain without sciatica  Comments:  discussed ddx including muscular strain. UA normal. cont ice/heat  Orders:  -     Urinalysis With Culture If Indicated - Urine, Clean Catch    4. Mixed hyperlipidemia  Comments:  if not at goal, will plan to try 20mg. she has several of 10mg at home    5. Anxiety  Comments:  Well-controlled.  Continue current meds    6. Mild episode of recurrent major depressive disorder (HCC)  Comments:  Well-controlled, continue current meds    7. Elevated glucose  Comments:  We will check A1c today.  Orders:  -     Hemoglobin A1c    8. Need for hepatitis C screening test  -     Hepatitis C antibody    Other orders  -     propranolol (INDERAL) 80 MG tablet; Take 1 tablet by mouth Daily.  Dispense: 90 tablet; Refill: 1  -     venlafaxine XR (Effexor XR) 37.5 MG 24 hr capsule; Take 1 capsule by mouth Daily.  Dispense: 90 capsule; Refill: 1        40 to 64: Counseling/Anticipatory Guidance Discussed: nutrition, physical activity, healthy weight, injury prevention, mental health, immunizations and screenings      Follow Up   Return in about 6 months (around 5/15/2023).  Patient was given instructions and counseling regarding her condition or for health maintenance advice. Please see specific information pulled into the AVS if appropriate.

## 2023-01-04 RX ORDER — PRAVASTATIN SODIUM 10 MG
TABLET ORAL
Qty: 90 TABLET | Refills: 1 | Status: SHIPPED | OUTPATIENT
Start: 2023-01-04

## 2023-01-10 RX ORDER — RIMEGEPANT SULFATE 75 MG/75MG
TABLET, ORALLY DISINTEGRATING ORAL
Qty: 8 TABLET | Refills: 3 | Status: SHIPPED | OUTPATIENT
Start: 2023-01-10

## 2023-01-12 ENCOUNTER — PRIOR AUTHORIZATION (OUTPATIENT)
Dept: INTERNAL MEDICINE | Facility: CLINIC | Age: 53
End: 2023-01-12
Payer: COMMERCIAL

## 2023-01-12 DIAGNOSIS — R19.5 POSITIVE COLORECTAL CANCER SCREENING USING COLOGUARD TEST: Primary | ICD-10-CM

## 2023-01-20 ENCOUNTER — OFFICE VISIT (OUTPATIENT)
Dept: SURGERY | Facility: CLINIC | Age: 53
End: 2023-01-20
Payer: COMMERCIAL

## 2023-01-20 ENCOUNTER — PREP FOR SURGERY (OUTPATIENT)
Dept: OTHER | Facility: HOSPITAL | Age: 53
End: 2023-01-20
Payer: COMMERCIAL

## 2023-01-20 VITALS — BODY MASS INDEX: 27.85 KG/M2 | HEIGHT: 64 IN | WEIGHT: 163.14 LBS | RESPIRATION RATE: 14 BRPM

## 2023-01-20 DIAGNOSIS — R19.5 POSITIVE COLORECTAL CANCER SCREENING USING COLOGUARD TEST: Primary | ICD-10-CM

## 2023-01-20 PROCEDURE — 99203 OFFICE O/P NEW LOW 30 MIN: CPT | Performed by: SURGERY

## 2023-01-20 NOTE — PROGRESS NOTES
Inpatient History and Physical Surgical Orders    Preadmission Location:   Preadmission Time:  Facility:  Surgery Date:  Surgery Time:  Preadmission Test date:     Chief Complaint  Outpatient History and Physical / Surgical Orders    Primary Care Provider: Karina Tee APRN    Referring Provider: Karina Tee APRN    Subjective      Patient Name: Isabel Corral : 1970    HPI  The patient is a 52-year-old female that was referred for a screening colonoscopy.  She recently had a Cologuard test that came back positive.  She denies any visible rectal bleeding.  She has not had a colonoscopy yet herself.    Past History:  Medical History: has a past medical history of Anxiety, Arthritis, Cluster headache, Depression, Headache, tension-type, and Migraine.   Surgical History: has a past surgical history that includes Hysterectomy and Breast Augmentation.   Family History: family history includes Aneurysm in her maternal grandmother; Dementia in her father; Migraines in her mother; Skin cancer in her maternal grandfather; Stroke in her maternal grandfather; Thyroid disease in her maternal grandmother.   Social History: reports that she has been smoking. She has been smoking an average of .25 packs per day. She has never used smokeless tobacco. She reports that she does not drink alcohol and does not use drugs.  Allergies: Patient has no known allergies.       Current Outpatient Medications:   •  acetaminophen (TYLENOL) 500 MG tablet, Take 500 mg by mouth Every 6 (Six) Hours As Needed for Mild Pain ., Disp: , Rfl:   •  cetirizine (zyrTEC) 10 MG tablet, Take 1 tablet by mouth Daily., Disp: , Rfl:   •  clonazePAM (KlonoPIN) 0.5 MG tablet, Take 1 tablet by mouth 2 (Two) Times a Day., Disp: 10 tablet, Rfl: 0  •  Coenzyme Q10 100 MG tablet, Take  by mouth., Disp: , Rfl:   •  cyclobenzaprine (FLEXERIL) 10 MG tablet, Take 1 tablet by mouth 3 (Three) Times a Day As Needed for Muscle Spasms., Disp: 90 tablet,  "Rfl: 2  •  ibuprofen (ADVIL,MOTRIN) 200 MG tablet, Take 200 mg by mouth Every 8 (Eight) Hours As Needed for Mild Pain ., Disp: , Rfl:   •  Nurtec 75 MG dispersible tablet, DISSOLVE 1 TABLET ON THE TONGUE DAILY AS NEEDED FOR MIGRAINE, Disp: 8 tablet, Rfl: 3  •  pravastatin (PRAVACHOL) 10 MG tablet, TAKE ONE TABLET BY MOUTH DAILY, Disp: 90 tablet, Rfl: 1  •  propranolol (INDERAL) 80 MG tablet, Take 1 tablet by mouth Daily., Disp: 90 tablet, Rfl: 1  •  pseudoephedrine (SUDAFED) 30 MG tablet, Take 30 mg by mouth Every 6 (Six) Hours As Needed for Congestion., Disp: , Rfl:   •  venlafaxine XR (Effexor XR) 37.5 MG 24 hr capsule, Take 1 capsule by mouth Daily., Disp: 90 capsule, Rfl: 1       Objective   Vital Signs:   Resp 14   Ht 162.6 cm (64.02\")   Wt 74 kg (163 lb 2.3 oz)   BMI 27.99 kg/m²       Physical Exam  Vitals and nursing note reviewed.   Constitutional:       Appearance: Normal appearance. The patient is well-developed.   Cardiovascular:      Rate and Rhythm: Normal rate and regular rhythm.   Pulmonary:      Effort: Pulmonary effort is normal.      Breath sounds: Normal air entry.   Abdominal:      General: Bowel sounds are normal.      Palpations: Abdomen is soft.      Skin:     General: Skin is warm and dry.   Neurological:      Mental Status: The patient is alert and oriented to person, place, and time.      Motor: Motor function is intact.   Psychiatric:         Mood and Affect: Mood normal.       Result Review :               Assessment and Plan   Diagnoses and all orders for this visit:    1. Positive colorectal cancer screening using Cologuard test (Primary)    We will schedule her for a colonoscopy.  I have described that procedure to her as well as the risk and benefits and she is agreeable to proceeding.    I  Zeke Parson MD  01/20/2023    "

## 2023-02-13 ENCOUNTER — HOSPITAL ENCOUNTER (OUTPATIENT)
Facility: HOSPITAL | Age: 53
Setting detail: HOSPITAL OUTPATIENT SURGERY
Discharge: HOME | End: 2023-02-13
Attending: SURGERY | Admitting: SURGERY
Payer: COMMERCIAL

## 2023-02-13 ENCOUNTER — ANESTHESIA EVENT (OUTPATIENT)
Dept: GASTROENTEROLOGY | Facility: HOSPITAL | Age: 53
End: 2023-02-13
Payer: COMMERCIAL

## 2023-02-13 ENCOUNTER — ANESTHESIA (OUTPATIENT)
Dept: GASTROENTEROLOGY | Facility: HOSPITAL | Age: 53
End: 2023-02-13
Payer: COMMERCIAL

## 2023-02-13 VITALS
SYSTOLIC BLOOD PRESSURE: 105 MMHG | BODY MASS INDEX: 26.27 KG/M2 | WEIGHT: 153.88 LBS | DIASTOLIC BLOOD PRESSURE: 80 MMHG | RESPIRATION RATE: 18 BRPM | OXYGEN SATURATION: 98 % | TEMPERATURE: 97.5 F | HEIGHT: 64 IN | HEART RATE: 60 BPM

## 2023-02-13 DIAGNOSIS — R19.5 POSITIVE COLORECTAL CANCER SCREENING USING COLOGUARD TEST: ICD-10-CM

## 2023-02-13 PROCEDURE — 88305 TISSUE EXAM BY PATHOLOGIST: CPT | Performed by: SURGERY

## 2023-02-13 PROCEDURE — 25010000002 PROPOFOL 10 MG/ML EMULSION: Performed by: NURSE ANESTHETIST, CERTIFIED REGISTERED

## 2023-02-13 RX ORDER — ONDANSETRON 2 MG/ML
4 INJECTION INTRAMUSCULAR; INTRAVENOUS ONCE AS NEEDED
Status: DISCONTINUED | OUTPATIENT
Start: 2023-02-13 | End: 2023-02-13 | Stop reason: HOSPADM

## 2023-02-13 RX ORDER — ONDANSETRON 4 MG/1
4 TABLET, FILM COATED ORAL ONCE AS NEEDED
Status: DISCONTINUED | OUTPATIENT
Start: 2023-02-13 | End: 2023-02-13 | Stop reason: HOSPADM

## 2023-02-13 RX ORDER — SODIUM CHLORIDE, SODIUM LACTATE, POTASSIUM CHLORIDE, CALCIUM CHLORIDE 600; 310; 30; 20 MG/100ML; MG/100ML; MG/100ML; MG/100ML
30 INJECTION, SOLUTION INTRAVENOUS CONTINUOUS
Status: DISCONTINUED | OUTPATIENT
Start: 2023-02-13 | End: 2023-02-13 | Stop reason: HOSPADM

## 2023-02-13 RX ORDER — LIDOCAINE HYDROCHLORIDE 20 MG/ML
INJECTION, SOLUTION EPIDURAL; INFILTRATION; INTRACAUDAL; PERINEURAL AS NEEDED
Status: DISCONTINUED | OUTPATIENT
Start: 2023-02-13 | End: 2023-02-13 | Stop reason: SURG

## 2023-02-13 RX ADMIN — PROPOFOL 250 MCG/KG/MIN: 10 INJECTION, EMULSION INTRAVENOUS at 13:17

## 2023-02-13 RX ADMIN — LIDOCAINE HYDROCHLORIDE 40 MG: 20 INJECTION, SOLUTION EPIDURAL; INFILTRATION; INTRACAUDAL; PERINEURAL at 13:17

## 2023-02-13 RX ADMIN — SODIUM CHLORIDE, POTASSIUM CHLORIDE, SODIUM LACTATE AND CALCIUM CHLORIDE 30 ML/HR: 600; 310; 30; 20 INJECTION, SOLUTION INTRAVENOUS at 12:48

## 2023-02-13 NOTE — ANESTHESIA POSTPROCEDURE EVALUATION
Patient: Isabel Corral    Procedure Summary     Date: 02/13/23 Room / Location: [facility] ENDOSCOPY 1 / [facility] ENDOSCOPY    Anesthesia Start: 1315 Anesthesia Stop: 1348    Procedure: COLONOSCOPY with forcep polypectomy, cold snare polypectomy Diagnosis:       Positive colorectal cancer screening using Cologuard test      (Positive colorectal cancer screening using Cologuard test [R19.5])    Surgeons: Zeke Parson MD Provider: Raquel Gil MD    Anesthesia Type: general, MAC ASA Status: 2          Anesthesia Type: general, MAC    Vitals  Vitals Value Taken Time   /80 02/13/23 1401   Temp 36.4 °C (97.5 °F) 02/13/23 1401   Pulse 62 02/13/23 1402   Resp 18 02/13/23 1401   SpO2 96 % 02/13/23 1402   Vitals shown include unvalidated device data.        Post Anesthesia Care and Evaluation    Patient location during evaluation: bedside  Patient participation: complete - patient participated  Level of consciousness: awake  Pain management: adequate    Airway patency: patent  PONV Status: none  Cardiovascular status: acceptable  Respiratory status: acceptable  Hydration status: acceptable    Comments: An Anesthesiologist personally participated in the most demanding procedures (including induction and emergence if applicable) in the anesthesia plan, monitored the course of anesthesia administration at frequent intervals and remained physically present and available for immediate diagnosis and treatment of emergencies.

## 2023-02-13 NOTE — ANESTHESIA PREPROCEDURE EVALUATION
Anesthesia Evaluation     Patient summary reviewed and Nursing notes reviewed   no history of anesthetic complications:  NPO Solid Status: > 8 hours  NPO Liquid Status: > 2 hours           Airway   Mallampati: II  TM distance: <3 FB  Neck ROM: full  Possible difficult intubation  Dental      Pulmonary - normal exam    breath sounds clear to auscultation  (+) a smoker Current Smoked day of surgery,   Cardiovascular - normal exam  Exercise tolerance: good (4-7 METS)    Patient on routine beta blocker and Beta blocker given within 24 hours of surgery  Rhythm: regular  Rate: normal    (+) hyperlipidemia,       Neuro/Psych  (+) headaches, psychiatric history Anxiety and Depression,    GI/Hepatic/Renal/Endo    (+)  GERD,      Musculoskeletal     Abdominal    Substance History - negative use     OB/GYN negative ob/gyn ROS         Other   arthritis,      ROS/Med Hx Other: PAT Nursing Notes unavailable.                   Anesthesia Plan    ASA 2     general and MAC     (Patient understands anesthesia not responsible for dental damage.)  intravenous induction     Anesthetic plan, risks, benefits, and alternatives have been provided, discussed and informed consent has been obtained with: patient.    Use of blood products discussed with patient .   Plan discussed with CRNA.        CODE STATUS:

## 2023-02-15 LAB
CYTO UR: NORMAL
LAB AP CASE REPORT: NORMAL
LAB AP CLINICAL INFORMATION: NORMAL
PATH REPORT.FINAL DX SPEC: NORMAL
PATH REPORT.GROSS SPEC: NORMAL

## 2023-02-27 ENCOUNTER — OFFICE VISIT (OUTPATIENT)
Dept: SURGERY | Facility: CLINIC | Age: 53
End: 2023-02-27
Payer: COMMERCIAL

## 2023-02-27 VITALS — HEIGHT: 64 IN | WEIGHT: 158 LBS | RESPIRATION RATE: 16 BRPM | BODY MASS INDEX: 26.98 KG/M2

## 2023-02-27 DIAGNOSIS — R19.5 POSITIVE COLORECTAL CANCER SCREENING USING COLOGUARD TEST: Primary | ICD-10-CM

## 2023-02-27 PROCEDURE — 99212 OFFICE O/P EST SF 10 MIN: CPT | Performed by: SURGERY

## 2023-02-27 NOTE — PROGRESS NOTES
Chief Complaint  Colonoscopy and Post-op    Subjective          Isabel Corral presents to [facility] GENERAL SURGERY  History of Present Illness    Isabel Corral is a 52 y.o. female  who presents today for a postoperative visit.     Patient is here for a follow-up after a colonoscopy that was done for a positive Cologuard test.  We removed some small polyps from the left colon and fortunately these all came back consistent with benign hyperplastic polyps.  She is doing well and had no complaints.    Past History:  Medical History: has a past medical history of Anxiety, Arthritis, Cluster headache, Depression, Headache, tension-type, Migraine, and Positive colorectal cancer screening using Cologuard test.   Surgical History: has a past surgical history that includes Hysterectomy; Breast Augmentation; and Colonoscopy (N/A, 2/13/2023).   Family History: family history includes Aneurysm in her maternal grandmother; Dementia in her father; Migraines in her mother; Skin cancer in her maternal grandfather; Stroke in her maternal grandfather; Thyroid disease in her maternal grandmother.   Social History: reports that she has been smoking cigarettes. She has been smoking an average of .25 packs per day. She has never used smokeless tobacco. She reports that she does not currently use alcohol. She reports that she does not use drugs.  Allergies: Patient has no known allergies.       Current Outpatient Medications:   •  acetaminophen (TYLENOL) 500 MG tablet, Take 500 mg by mouth Every 6 (Six) Hours As Needed for Mild Pain ., Disp: , Rfl:   •  cetirizine (zyrTEC) 10 MG tablet, Take 1 tablet by mouth Daily., Disp: , Rfl:   •  clonazePAM (KlonoPIN) 0.5 MG tablet, Take 1 tablet by mouth 2 (Two) Times a Day., Disp: 10 tablet, Rfl: 0  •  Coenzyme Q10 100 MG tablet, Take  by mouth., Disp: , Rfl:   •  cyclobenzaprine (FLEXERIL) 10 MG tablet, Take 1 tablet by mouth 3 (Three) Times a Day As Needed  "for Muscle Spasms., Disp: 90 tablet, Rfl: 2  •  ibuprofen (ADVIL,MOTRIN) 200 MG tablet, Take 200 mg by mouth Every 8 (Eight) Hours As Needed for Mild Pain ., Disp: , Rfl:   •  Nurtec 75 MG dispersible tablet, DISSOLVE 1 TABLET ON THE TONGUE DAILY AS NEEDED FOR MIGRAINE, Disp: 8 tablet, Rfl: 3  •  pravastatin (PRAVACHOL) 10 MG tablet, TAKE ONE TABLET BY MOUTH DAILY, Disp: 90 tablet, Rfl: 1  •  propranolol (INDERAL) 80 MG tablet, Take 1 tablet by mouth Daily., Disp: 90 tablet, Rfl: 1  •  pseudoephedrine (SUDAFED) 30 MG tablet, Take 30 mg by mouth Every 6 (Six) Hours As Needed for Congestion., Disp: , Rfl:   •  venlafaxine XR (Effexor XR) 37.5 MG 24 hr capsule, Take 1 capsule by mouth Daily., Disp: 90 capsule, Rfl: 1       Physical Exam  She is doing well and her abdomen is soft.  Objective     Vital Signs:   Resp 16   Ht 162.6 cm (64.02\")   Wt 71.7 kg (158 lb)   BMI 27.11 kg/m²              Assessment and Plan    Diagnoses and all orders for this visit:    1. Positive colorectal cancer screening using Cologuard test (Primary)    We will see her back on an as-needed basis.  I recommended she repeat her colonoscopy in 5 years.      "

## 2023-05-15 ENCOUNTER — OFFICE VISIT (OUTPATIENT)
Dept: INTERNAL MEDICINE | Facility: CLINIC | Age: 53
End: 2023-05-15
Payer: COMMERCIAL

## 2023-05-15 VITALS
WEIGHT: 164 LBS | RESPIRATION RATE: 16 BRPM | HEART RATE: 98 BPM | BODY MASS INDEX: 28.14 KG/M2 | OXYGEN SATURATION: 99 % | DIASTOLIC BLOOD PRESSURE: 58 MMHG | SYSTOLIC BLOOD PRESSURE: 104 MMHG | TEMPERATURE: 98.2 F

## 2023-05-15 DIAGNOSIS — G43.709 CHRONIC MIGRAINE WITHOUT AURA WITHOUT STATUS MIGRAINOSUS, NOT INTRACTABLE: ICD-10-CM

## 2023-05-15 DIAGNOSIS — F41.9 ANXIETY: ICD-10-CM

## 2023-05-15 DIAGNOSIS — R23.2 HOT FLASHES: ICD-10-CM

## 2023-05-15 DIAGNOSIS — E78.2 MIXED HYPERLIPIDEMIA: Primary | ICD-10-CM

## 2023-05-15 DIAGNOSIS — F33.0 MILD EPISODE OF RECURRENT MAJOR DEPRESSIVE DISORDER: ICD-10-CM

## 2023-05-15 DIAGNOSIS — J01.00 ACUTE NON-RECURRENT MAXILLARY SINUSITIS: ICD-10-CM

## 2023-05-15 LAB
ALBUMIN SERPL-MCNC: 4.3 G/DL (ref 3.5–5.2)
ALBUMIN/GLOB SERPL: 1.6 G/DL
ALP SERPL-CCNC: 121 U/L (ref 39–117)
ALT SERPL W P-5'-P-CCNC: 18 U/L (ref 1–33)
ANION GAP SERPL CALCULATED.3IONS-SCNC: 10.9 MMOL/L (ref 5–15)
AST SERPL-CCNC: 19 U/L (ref 1–32)
BASOPHILS # BLD AUTO: 0.03 10*3/MM3 (ref 0–0.2)
BASOPHILS NFR BLD AUTO: 0.4 % (ref 0–1.5)
BILIRUB SERPL-MCNC: 0.3 MG/DL (ref 0–1.2)
BUN SERPL-MCNC: 9 MG/DL (ref 6–20)
BUN/CREAT SERPL: 12.9 (ref 7–25)
CALCIUM SPEC-SCNC: 9.8 MG/DL (ref 8.6–10.5)
CHLORIDE SERPL-SCNC: 108 MMOL/L (ref 98–107)
CHOLEST SERPL-MCNC: 230 MG/DL (ref 0–200)
CO2 SERPL-SCNC: 22.1 MMOL/L (ref 22–29)
CREAT SERPL-MCNC: 0.7 MG/DL (ref 0.57–1)
DEPRECATED RDW RBC AUTO: 42.5 FL (ref 37–54)
EGFRCR SERPLBLD CKD-EPI 2021: 103.6 ML/MIN/1.73
EOSINOPHIL # BLD AUTO: 0.11 10*3/MM3 (ref 0–0.4)
EOSINOPHIL NFR BLD AUTO: 1.4 % (ref 0.3–6.2)
ERYTHROCYTE [DISTWIDTH] IN BLOOD BY AUTOMATED COUNT: 11.9 % (ref 12.3–15.4)
GLOBULIN UR ELPH-MCNC: 2.7 GM/DL
GLUCOSE SERPL-MCNC: 104 MG/DL (ref 65–99)
HCT VFR BLD AUTO: 41.8 % (ref 34–46.6)
HDLC SERPL-MCNC: 49 MG/DL (ref 40–60)
HGB BLD-MCNC: 14.2 G/DL (ref 12–15.9)
IMM GRANULOCYTES # BLD AUTO: 0.05 10*3/MM3 (ref 0–0.05)
IMM GRANULOCYTES NFR BLD AUTO: 0.6 % (ref 0–0.5)
LDLC SERPL CALC-MCNC: 150 MG/DL (ref 0–100)
LDLC/HDLC SERPL: 3 {RATIO}
LYMPHOCYTES # BLD AUTO: 3.37 10*3/MM3 (ref 0.7–3.1)
LYMPHOCYTES NFR BLD AUTO: 43 % (ref 19.6–45.3)
MCH RBC QN AUTO: 32.9 PG (ref 26.6–33)
MCHC RBC AUTO-ENTMCNC: 34 G/DL (ref 31.5–35.7)
MCV RBC AUTO: 97 FL (ref 79–97)
MONOCYTES # BLD AUTO: 0.63 10*3/MM3 (ref 0.1–0.9)
MONOCYTES NFR BLD AUTO: 8 % (ref 5–12)
NEUTROPHILS NFR BLD AUTO: 3.65 10*3/MM3 (ref 1.7–7)
NEUTROPHILS NFR BLD AUTO: 46.6 % (ref 42.7–76)
NRBC BLD AUTO-RTO: 0 /100 WBC (ref 0–0.2)
PLATELET # BLD AUTO: 228 10*3/MM3 (ref 140–450)
PMV BLD AUTO: 11.7 FL (ref 6–12)
POTASSIUM SERPL-SCNC: 4.4 MMOL/L (ref 3.5–5.2)
PROT SERPL-MCNC: 7 G/DL (ref 6–8.5)
RBC # BLD AUTO: 4.31 10*6/MM3 (ref 3.77–5.28)
SODIUM SERPL-SCNC: 141 MMOL/L (ref 136–145)
TRIGL SERPL-MCNC: 170 MG/DL (ref 0–150)
TSH SERPL DL<=0.05 MIU/L-ACNC: 1.71 UIU/ML (ref 0.27–4.2)
VLDLC SERPL-MCNC: 31 MG/DL (ref 5–40)
WBC NRBC COR # BLD: 7.84 10*3/MM3 (ref 3.4–10.8)

## 2023-05-15 PROCEDURE — 80061 LIPID PANEL: CPT | Performed by: NURSE PRACTITIONER

## 2023-05-15 PROCEDURE — 80050 GENERAL HEALTH PANEL: CPT | Performed by: NURSE PRACTITIONER

## 2023-05-15 RX ORDER — FLUCONAZOLE 150 MG/1
150 TABLET ORAL ONCE
Qty: 1 TABLET | Refills: 0 | Status: SHIPPED | OUTPATIENT
Start: 2023-05-15 | End: 2023-05-15

## 2023-05-15 RX ORDER — AMOXICILLIN AND CLAVULANATE POTASSIUM 875; 125 MG/1; MG/1
1 TABLET, FILM COATED ORAL 2 TIMES DAILY
Qty: 20 TABLET | Refills: 0 | Status: SHIPPED | OUTPATIENT
Start: 2023-05-15 | End: 2023-05-25

## 2023-05-15 RX ORDER — VENLAFAXINE HYDROCHLORIDE 37.5 MG/1
37.5 CAPSULE, EXTENDED RELEASE ORAL DAILY
Qty: 90 CAPSULE | Refills: 1 | Status: SHIPPED | OUTPATIENT
Start: 2023-05-15

## 2023-05-15 RX ORDER — PROPRANOLOL HYDROCHLORIDE 80 MG/1
80 TABLET ORAL DAILY
Qty: 90 TABLET | Refills: 1 | Status: SHIPPED | OUTPATIENT
Start: 2023-05-15

## 2023-05-15 NOTE — PROGRESS NOTES
Chief Complaint  Hyperlipidemia, Colonoscopy (5 polyps. Cologaurd came back positive for cancer.  ), and Med Refill (Nurtec has not been filled since Jan)    Subjective          Isabel Meredith Corral presents to [facility]ICS  History of Present Illness  +cologuard, had colonoscopy, repeat in 3 years    HLD-she reports having some joint pain but no leg cramps with pravastatin  Failed lipitor, crestor previously    Sinus pressure for the last few weeks  worsenign in the last few days  Allergies sx, taking zyrtec    Hot flashes  Anxiety/depression-effexor helping mood irritability    Ins wants nurtec to be rx'd by neurology    Objective   Vital Signs:   /58 (BP Location: Left arm, Patient Position: Sitting, Cuff Size: Adult)   Pulse 98   Temp 98.2 °F (36.8 °C) (Temporal)   Resp 16   Wt 74.4 kg (164 lb)   SpO2 99%   BMI 28.14 kg/m²     Physical Exam  Vitals and nursing note reviewed.   Constitutional:       General: She is not in acute distress.     Appearance: Normal appearance.   HENT:      Head: Normocephalic and atraumatic.      Right Ear: Tympanic membrane, ear canal and external ear normal.      Left Ear: Tympanic membrane, ear canal and external ear normal.      Nose: Congestion present.      Comments: Maxillary tenderness     Mouth/Throat:      Mouth: Mucous membranes are moist.   Eyes:      Conjunctiva/sclera: Conjunctivae normal.   Cardiovascular:      Rate and Rhythm: Normal rate and regular rhythm.      Pulses: Normal pulses.      Heart sounds: Normal heart sounds. No murmur heard.    No friction rub. No gallop.   Pulmonary:      Effort: Pulmonary effort is normal. No respiratory distress.      Breath sounds: No wheezing, rhonchi or rales.   Musculoskeletal:      Cervical back: Neck supple.      Right lower leg: No edema.      Left lower leg: No edema.   Skin:     General: Skin is warm and dry.   Neurological:      General: No focal deficit  present.      Mental Status: She is alert and oriented to person, place, and time.   Psychiatric:         Mood and Affect: Mood normal.         Behavior: Behavior normal.        Result Review :          Procedures      Assessment and Plan    Diagnoses and all orders for this visit:    1. Mixed hyperlipidemia (Primary)  Comments:  We will try to get pitavastatin covered again  Orders:  -     Comprehensive Metabolic Panel  -     Lipid Panel    2. Hot flashes  Comments:  tolerable. continue effexor  Orders:  -     CBC & Differential  -     TSH    3. Chronic migraine without aura without status migrainosus, not intractable  Comments:  she will see neurology again for [facility] due to insurance requirement    4. Acute non-recurrent maxillary sinusitis  Comments:  treating with augmentin, diflucan prn    5. Anxiety    6. Mild episode of recurrent major depressive disorder  Comments:  Doing well.  Continue Effexor    Other orders  -     pitavastatin calcium (LIVALO) 1 MG tablet tablet; Take 1 tablet by mouth Every Night.  Dispense: 90 tablet; Refill: 0  -     amoxicillin-clavulanate (Augmentin) 875-125 MG per tablet; Take 1 tablet by mouth 2 (Two) Times a Day for 10 days.  Dispense: 20 tablet; Refill: 0  -     fluconazole (Diflucan) 150 MG tablet; Take 1 tablet by mouth 1 (One) Time for 1 dose.  Dispense: 1 tablet; Refill: 0  -     propranolol (INDERAL) 80 MG tablet; Take 1 tablet by mouth Daily.  Dispense: 90 tablet; Refill: 1  -     venlafaxine XR (Effexor XR) 37.5 MG 24 hr capsule; Take 1 capsule by mouth Daily.  Dispense: 90 capsule; Refill: 1              Follow Up   Return in about 6 months (around 11/15/2023).  Patient was given instructions and counseling regarding her condition or for health maintenance advice. Please see specific information pulled into the AVS if appropriate.

## 2023-05-16 ENCOUNTER — PRIOR AUTHORIZATION (OUTPATIENT)
Dept: INTERNAL MEDICINE | Facility: CLINIC | Age: 53
End: 2023-05-16
Payer: COMMERCIAL

## 2023-05-19 NOTE — TELEPHONE ENCOUNTER
Per your health plan's criteria, this drug is covered if you meet the following:  You have tried or cannot use at least one drugs from the following:  (a) Lovastatin.  (b) Pravastatin.  (c) Simvastatin.

## 2023-05-25 NOTE — TELEPHONE ENCOUNTER
Request Reference Number: [id number]. LIVALO TAB 1MG is approved through 05/25/2024. Your patient may now fill this prescription and it will be covered.

## 2023-08-02 RX ORDER — VENLAFAXINE HYDROCHLORIDE 37.5 MG/1
CAPSULE, EXTENDED RELEASE ORAL
Qty: 90 CAPSULE | Refills: 1 | Status: SHIPPED | OUTPATIENT
Start: 2023-08-02

## 2023-08-02 RX ORDER — PROPRANOLOL HYDROCHLORIDE 80 MG/1
TABLET ORAL
Qty: 90 TABLET | Refills: 1 | Status: SHIPPED | OUTPATIENT
Start: 2023-08-02

## 2023-10-19 RX ORDER — PRAVASTATIN SODIUM 10 MG
10 TABLET ORAL DAILY
Qty: 90 TABLET | Refills: 0 | Status: SHIPPED | OUTPATIENT
Start: 2023-10-19

## 2023-11-18 PROCEDURE — 87635 SARS-COV-2 COVID-19 AMP PRB: CPT | Performed by: FAMILY MEDICINE

## 2024-01-30 RX ORDER — PROPRANOLOL HYDROCHLORIDE 80 MG/1
TABLET ORAL
Qty: 90 TABLET | Refills: 1 | Status: SHIPPED | OUTPATIENT
Start: 2024-01-30

## 2024-01-30 RX ORDER — VENLAFAXINE HYDROCHLORIDE 37.5 MG/1
CAPSULE, EXTENDED RELEASE ORAL
Qty: 90 CAPSULE | Refills: 1 | Status: SHIPPED | OUTPATIENT
Start: 2024-01-30

## 2024-08-05 RX ORDER — PROPRANOLOL HYDROCHLORIDE 80 MG/1
80 TABLET ORAL DAILY
Qty: 90 TABLET | Refills: 1 | Status: SHIPPED | OUTPATIENT
Start: 2024-08-05

## 2024-08-05 RX ORDER — VENLAFAXINE HYDROCHLORIDE 37.5 MG/1
37.5 CAPSULE, EXTENDED RELEASE ORAL DAILY
Qty: 90 CAPSULE | Refills: 1 | Status: SHIPPED | OUTPATIENT
Start: 2024-08-05

## 2024-08-05 NOTE — TELEPHONE ENCOUNTER
"Caller: Ellis Isabel Meredith \"CYNTHIA\"    Relationship: Self    Best call back number: [phone]    Requested Prescriptions:   Requested Prescriptions     Pending Prescriptions Disp Refills    propranolol (INDERAL) 80 MG tablet [Pharmacy Med Name: PROPRANOLOL 80 MG TABLET] 90 tablet 1     Sig: TAKE 1 TABLET BY MOUTH DAILY    venlafaxine XR (EFFEXOR-XR) 37.5 MG 24 hr capsule [Pharmacy Med Name: VENLAFAXINE HCL ER 37.5 MG CAP] 90 capsule 1     Sig: TAKE 1 CAPSULE BY MOUTH DAILY        Pharmacy where request should be sent: [facility] [id number]  [place] - [address] AT [facility] [address] [facility] [phone] [facility] [phone] FX     Last office visit with prescribing clinician: 5/15/2023   Last telemedicine visit with prescribing clinician: Visit date not found   Next office visit with prescribing clinician: 8/12/2024     Additional details provided by patient:   PATIENT WILL BE OUT OF THESE TOMORROW, 08/06/2024, AND SHE WOULD LIKE REFILLS TO GET THROUGH UNTIL 08/12/2024 APPOINTMENT.     Does the patient have less than a 3 day supply:  [x] Yes  [] No    Would you like a call back once the refill request has been completed: [] Yes [x] No    If the office needs to give you a call back, can they leave a voicemail: [] Yes [x] No    Jase Velez   08/05/24 13:26 EDT     "

## 2024-08-12 ENCOUNTER — OFFICE VISIT (OUTPATIENT)
Dept: INTERNAL MEDICINE | Facility: CLINIC | Age: 54
End: 2024-08-12
Payer: COMMERCIAL

## 2024-08-12 VITALS
OXYGEN SATURATION: 97 % | WEIGHT: 162 LBS | HEART RATE: 59 BPM | TEMPERATURE: 97.6 F | RESPIRATION RATE: 18 BRPM | DIASTOLIC BLOOD PRESSURE: 70 MMHG | HEIGHT: 64 IN | BODY MASS INDEX: 27.66 KG/M2 | SYSTOLIC BLOOD PRESSURE: 122 MMHG

## 2024-08-12 DIAGNOSIS — R07.9 CHEST PAIN, UNSPECIFIED TYPE: ICD-10-CM

## 2024-08-12 DIAGNOSIS — E78.5 HYPERLIPIDEMIA, UNSPECIFIED HYPERLIPIDEMIA TYPE: ICD-10-CM

## 2024-08-12 DIAGNOSIS — Z00.00 ANNUAL PHYSICAL EXAM: Primary | ICD-10-CM

## 2024-08-12 DIAGNOSIS — J32.0 MAXILLARY SINUSITIS, UNSPECIFIED CHRONICITY: ICD-10-CM

## 2024-08-12 DIAGNOSIS — Z78.9 STATIN INTOLERANCE: ICD-10-CM

## 2024-08-12 PROCEDURE — 99396 PREV VISIT EST AGE 40-64: CPT | Performed by: NURSE PRACTITIONER

## 2024-08-12 PROCEDURE — 99214 OFFICE O/P EST MOD 30 MIN: CPT | Performed by: NURSE PRACTITIONER

## 2024-08-12 PROCEDURE — 93000 ELECTROCARDIOGRAM COMPLETE: CPT | Performed by: NURSE PRACTITIONER

## 2024-08-12 RX ORDER — AMOXICILLIN AND CLAVULANATE POTASSIUM 875; 125 MG/1; MG/1
1 TABLET, FILM COATED ORAL 2 TIMES DAILY
Qty: 20 TABLET | Refills: 0 | Status: SHIPPED | OUTPATIENT
Start: 2024-08-12 | End: 2024-08-22

## 2024-08-12 RX ORDER — TRIAMCINOLONE ACETONIDE 55 UG/1
2 SPRAY, METERED NASAL DAILY
Qty: 16.5 G | Refills: 2 | Status: SHIPPED | OUTPATIENT
Start: 2024-08-12

## 2024-08-12 RX ORDER — FLUCONAZOLE 150 MG/1
150 TABLET ORAL ONCE
Qty: 1 TABLET | Refills: 1 | Status: SHIPPED | OUTPATIENT
Start: 2024-08-12 | End: 2024-08-12

## 2024-08-12 RX ORDER — EZETIMIBE 10 MG/1
10 TABLET ORAL DAILY
Qty: 90 TABLET | Refills: 1 | Status: SHIPPED | OUTPATIENT
Start: 2024-08-12

## 2024-08-12 NOTE — PROGRESS NOTES
"Chief Complaint  Annual Exam (Stopped taking the Pravastatin due to joint pain ), Nasal Congestion (4 days), Sinus Problem (Sinus pressure in cheekbones and head- 4 days), Fatigue, and Cough (Coughing up brown phlegm- 4 days)    Subjective          Isabel Meredith Corral presents to [facility]  History of Present Illness  Annual physical    HLD-Stopped taking pravastatin due to joint pain  Statin intolerance  She reports having some episodes of chest pain over the last year  She has not had recent eval for this-normal stress in 2020  She has also had sweating episodes with exertion  Some soa with activity  Smoking <1/2 ppd  + family hx for CVD  Denies chest pain today    Nasal congestion x4 days  She reports issues with allergies off and on for several weeks  Pressure is severe in the last 4 days  Headache, frontal    She also reports fatigue and hot flashes    Anxiety/mood-doing well with effexor  Objective   Vital Signs:   /70 (BP Location: Left arm, Patient Position: Sitting, Cuff Size: Adult)   Pulse 59   Temp 97.6 °F (36.4 °C)   Resp 18   Ht 162.6 cm (64\")   Wt 73.5 kg (162 lb)   SpO2 97%   BMI 27.81 kg/m²     Physical Exam  Vitals and nursing note reviewed.   Constitutional:       General: She is not in acute distress.     Appearance: Normal appearance.   HENT:      Head: Normocephalic and atraumatic.      Right Ear: External ear normal.      Left Ear: External ear normal.      Nose: Nose normal.      Mouth/Throat:      Mouth: Mucous membranes are moist.   Eyes:      Conjunctiva/sclera: Conjunctivae normal.   Cardiovascular:      Rate and Rhythm: Normal rate and regular rhythm.      Pulses: Normal pulses.      Heart sounds: Normal heart sounds. No murmur heard.     No friction rub. No gallop.   Pulmonary:      Effort: Pulmonary effort is normal. No respiratory distress.      Breath sounds: No wheezing, rhonchi or rales.   Musculoskeletal:      " Cervical back: Neck supple.      Right lower leg: No edema.      Left lower leg: No edema.   Skin:     General: Skin is warm and dry.   Neurological:      General: No focal deficit present.      Mental Status: She is alert and oriented to person, place, and time.   Psychiatric:         Mood and Affect: Mood normal.         Behavior: Behavior normal.        Result Review :            ECG 12 Lead    Date/Time: 8/12/2024 4:09 PM  Performed by: Karina Tee APRN    Authorized by: Karina Tee APRN  Comparison: not compared with previous ECG   Rhythm: sinus bradycardia  Rate: normal  BPM: 57  Conduction: conduction normal  QRS axis: normal  Other findings: poor R wave progression    Clinical impression: abnormal EKG            Assessment and Plan    Diagnoses and all orders for this visit:    1. Annual physical exam (Primary)  -     Comprehensive Metabolic Panel  -     CBC & Differential  -     TSH  -     Lipid Panel    2. Maxillary sinusitis, unspecified chronicity  Comments:  will treat sinusitis    3. Hyperlipidemia, unspecified hyperlipidemia type  Comments:  will try zetia since she has statin intolerance    4. Statin intolerance    5. Chest pain, unspecified type  Comments:  eKG in office today. sending for stress test. she will go to the ER with recurrent chest pain  Orders:  -     ECG 12 Lead  -     Treadmill Stress Test; Future    Other orders  -     Triamcinolone Acetonide (Nasacort Allergy 24HR) 55 MCG/ACT nasal inhaler; 2 sprays into the nostril(s) as directed by provider Daily.  Dispense: 16.5 g; Refill: 2  -     amoxicillin-clavulanate (AUGMENTIN) 875-125 MG per tablet; Take 1 tablet by mouth 2 (Two) Times a Day for 10 days.  Dispense: 20 tablet; Refill: 0  -     fluconazole (Diflucan) 150 MG tablet; Take 1 tablet by mouth 1 (One) Time for 1 dose.  Dispense: 1 tablet; Refill: 1  -     ezetimibe (Zetia) 10 MG tablet; Take 1 tablet by mouth Daily.  Dispense: 90 tablet; Refill: 1              Follow Up    Return in about 3 months (around 11/12/2024).  Patient was given instructions and counseling regarding her condition or for health maintenance advice. Please see specific information pulled into the AVS if appropriate.

## 2025-02-07 RX ORDER — PROPRANOLOL HYDROCHLORIDE 80 MG/1
80 TABLET ORAL DAILY
Qty: 90 TABLET | Refills: 1 | Status: SHIPPED | OUTPATIENT
Start: 2025-02-07

## 2025-02-07 RX ORDER — VENLAFAXINE HYDROCHLORIDE 37.5 MG/1
37.5 CAPSULE, EXTENDED RELEASE ORAL DAILY
Qty: 90 CAPSULE | Refills: 1 | Status: SHIPPED | OUTPATIENT
Start: 2025-02-07

## 2025-08-05 RX ORDER — VENLAFAXINE HYDROCHLORIDE 37.5 MG/1
37.5 CAPSULE, EXTENDED RELEASE ORAL DAILY
Qty: 90 CAPSULE | Refills: 1 | Status: SHIPPED | OUTPATIENT
Start: 2025-08-05

## 2025-08-05 RX ORDER — PROPRANOLOL HYDROCHLORIDE 80 MG/1
80 TABLET ORAL DAILY
Qty: 90 TABLET | Refills: 1 | Status: SHIPPED | OUTPATIENT
Start: 2025-08-05

## (undated) DEVICE — SOLIDIFIER LIQLOC PLS 1500CC BT

## (undated) DEVICE — Device: Brand: DEFENDO AIR/WATER/SUCTION AND BIOPSY VALVE

## (undated) DEVICE — GOWN,REINFRCE,POLY,SIRUS,BREATH SLV,XXLG: Brand: MEDLINE

## (undated) DEVICE — CONN JET HYDRA H20 AUXILIARY DISP

## (undated) DEVICE — THE SINGLE USE ETRAP – POLYP TRAP IS USED FOR SUCTION RETRIEVAL OF ENDOSCOPICALLY REMOVED POLYPS.: Brand: ETRAP

## (undated) DEVICE — SOL IRR NACL 0.9PCT BT 1000ML

## (undated) DEVICE — Device

## (undated) DEVICE — GLV SURG SENSICARE PI ORTHO SZ8 LF STRL

## (undated) DEVICE — SNAR POLYP CAPTIFLEX XS/OVL 11X2.4MM 240CM 1P/U

## (undated) DEVICE — SOL IRRG H2O PL/BG 1000ML STRL

## (undated) DEVICE — SINGLE-USE BIOPSY FORCEPS: Brand: RADIAL JAW 4

## (undated) DEVICE — LINER SURG CANSTR SXN S/RIGD 1500CC

## (undated) DEVICE — STERILE POLYISOPRENE POWDER-FREE SURGICAL GLOVES WITH EMOLLIENT COATING: Brand: PROTEXIS